# Patient Record
Sex: FEMALE | Race: ASIAN | NOT HISPANIC OR LATINO | Employment: FULL TIME | URBAN - METROPOLITAN AREA
[De-identification: names, ages, dates, MRNs, and addresses within clinical notes are randomized per-mention and may not be internally consistent; named-entity substitution may affect disease eponyms.]

---

## 2017-02-11 ENCOUNTER — CHARTING TRANS (OUTPATIENT)
Dept: URGENT CARE | Age: 37
End: 2017-02-11

## 2017-02-11 ASSESSMENT — PAIN SCALES - GENERAL: PAINLEVEL_OUTOF10: 5

## 2017-04-04 ENCOUNTER — LAB ENCOUNTER (OUTPATIENT)
Dept: LAB | Age: 37
End: 2017-04-04
Attending: FAMILY MEDICINE
Payer: COMMERCIAL

## 2017-04-04 ENCOUNTER — OFFICE VISIT (OUTPATIENT)
Dept: FAMILY MEDICINE CLINIC | Facility: CLINIC | Age: 37
End: 2017-04-04

## 2017-04-04 VITALS
SYSTOLIC BLOOD PRESSURE: 112 MMHG | OXYGEN SATURATION: 97 % | HEART RATE: 76 BPM | HEIGHT: 62 IN | RESPIRATION RATE: 18 BRPM | DIASTOLIC BLOOD PRESSURE: 74 MMHG | TEMPERATURE: 97 F | WEIGHT: 166.38 LBS | BODY MASS INDEX: 30.62 KG/M2

## 2017-04-04 DIAGNOSIS — E55.9 VITAMIN D DEFICIENCY: ICD-10-CM

## 2017-04-04 DIAGNOSIS — L21.9 SEBORRHEIC DERMATITIS OF SCALP: ICD-10-CM

## 2017-04-04 DIAGNOSIS — Z00.00 ROUTINE GENERAL MEDICAL EXAMINATION AT A HEALTH CARE FACILITY: ICD-10-CM

## 2017-04-04 DIAGNOSIS — Z00.00 ROUTINE GENERAL MEDICAL EXAMINATION AT A HEALTH CARE FACILITY: Primary | ICD-10-CM

## 2017-04-04 DIAGNOSIS — E66.9 OBESITY (BMI 30.0-34.9): ICD-10-CM

## 2017-04-04 DIAGNOSIS — Z01.419 ENCOUNTER FOR ROUTINE GYNECOLOGICAL EXAMINATION WITH PAPANICOLAOU SMEAR OF CERVIX: ICD-10-CM

## 2017-04-04 PROCEDURE — 82607 VITAMIN B-12: CPT | Performed by: FAMILY MEDICINE

## 2017-04-04 PROCEDURE — 80061 LIPID PANEL: CPT | Performed by: FAMILY MEDICINE

## 2017-04-04 PROCEDURE — 36415 COLL VENOUS BLD VENIPUNCTURE: CPT | Performed by: FAMILY MEDICINE

## 2017-04-04 PROCEDURE — 83550 IRON BINDING TEST: CPT | Performed by: FAMILY MEDICINE

## 2017-04-04 PROCEDURE — 82306 VITAMIN D 25 HYDROXY: CPT | Performed by: FAMILY MEDICINE

## 2017-04-04 PROCEDURE — 88175 CYTOPATH C/V AUTO FLUID REDO: CPT | Performed by: FAMILY MEDICINE

## 2017-04-04 PROCEDURE — 99395 PREV VISIT EST AGE 18-39: CPT | Performed by: FAMILY MEDICINE

## 2017-04-04 PROCEDURE — 82728 ASSAY OF FERRITIN: CPT | Performed by: FAMILY MEDICINE

## 2017-04-04 PROCEDURE — 83540 ASSAY OF IRON: CPT | Performed by: FAMILY MEDICINE

## 2017-04-04 PROCEDURE — 80050 GENERAL HEALTH PANEL: CPT | Performed by: FAMILY MEDICINE

## 2017-04-04 NOTE — PROGRESS NOTES
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 HPI:   Patient is seen for her annual physical and pap  Last pap in 2014 was normal    PAST MEDICAL HISTORY:   History reviewed.  No pertinent past m heartbeat, faintness or syncope, no chest pressure or chest pain on exertion. No edema or varicose veins. GI: No change in appetite, diarrhea, constipation, or blood in stool. No hemorrhoids.  Has occasional heart burn  : No pain, frequency, urgency or i anteverted. Adnexae nontender, no masses. MUSCULOSKELETAL: Back and extremities within normal limits  EXTREMITIES: no cyanosis, clubbing or edema.  Peripheral pulses normal.  NEURO: Nonfocal exam. Oriented times three,cranial nerves are intact,motor and se

## 2017-04-04 NOTE — PATIENT INSTRUCTIONS
Prevention Guidelines, Women Ages 25 to 44  Screening tests and vaccines are an important part of managing your health. Health counseling is essential, too. Below are guidelines for these, for women ages 25 to 44.  Talk with your healthcare provider to ma Chickenpox (varicella) All women in this age group who have no record of this infection or vaccine 2 doses; the second dose should be given 4 to 8 weeks after the first dose   Hepatitis A Women at increased risk for infection – talk with your healthcare pr Breast cancer and chemoprevention Women at high risk for breast cancer When your risk is known   Diet and exercise Women who are overweight or obese When diagnosed, and then at routine exams   Domestic violence Women at the age in which they are able to ha Your goal doesn't even have to be a specific weight. You may decide on a fitness goal (such as being able to walk 10 miles a week), or a health goal (such as lowering your blood pressure).  Choose a goal that is measurable and reasonable, so you know when y

## 2017-04-10 NOTE — PROGRESS NOTES
Quick Note:    Spoke to patient informed of needing test results appointment.    4/11/2017 10:00 AM Vikki mAin MD EMG 21 EMG [address]    ______

## 2017-04-11 ENCOUNTER — OFFICE VISIT (OUTPATIENT)
Dept: FAMILY MEDICINE CLINIC | Facility: CLINIC | Age: 37
End: 2017-04-11

## 2017-04-11 VITALS
HEART RATE: 81 BPM | WEIGHT: 165.13 LBS | TEMPERATURE: 98 F | BODY MASS INDEX: 30 KG/M2 | RESPIRATION RATE: 20 BRPM | SYSTOLIC BLOOD PRESSURE: 112 MMHG | OXYGEN SATURATION: 99 % | DIASTOLIC BLOOD PRESSURE: 72 MMHG

## 2017-04-11 DIAGNOSIS — E55.9 VITAMIN D DEFICIENCY: ICD-10-CM

## 2017-04-11 DIAGNOSIS — D50.0 IRON DEFICIENCY ANEMIA DUE TO CHRONIC BLOOD LOSS: Primary | ICD-10-CM

## 2017-04-11 DIAGNOSIS — L40.9 PSORIASIS OF SCALP: ICD-10-CM

## 2017-04-11 DIAGNOSIS — E53.8 VITAMIN B12 DEFICIENCY: ICD-10-CM

## 2017-04-11 PROCEDURE — 99213 OFFICE O/P EST LOW 20 MIN: CPT | Performed by: FAMILY MEDICINE

## 2017-04-11 RX ORDER — ERGOCALCIFEROL 1.25 MG/1
50000 CAPSULE ORAL WEEKLY
Qty: 12 CAPSULE | Refills: 0 | Status: SHIPPED | OUTPATIENT
Start: 2017-04-11 | End: 2017-11-22

## 2017-04-11 NOTE — PATIENT INSTRUCTIONS
Ferrous sulfate 325 mg 2 times daily for 3 months and then once daily    Vitamin B12 overt the counter 1000 mg daily    Shampoo once daily for 1 week, 2 times a week for 3 weeks and stop.

## 2017-04-11 NOTE — PROGRESS NOTES
Keegan Sears is a 29year old female. Patient presents with:  Test Results: Discuss lab results. HPI:   Patient is seen for follow up and to discuss her labs.     Complaining of dry flaky scalp, patient states has been using over the counter joseline Lymphocytes %          Monocytes %          Eosinophils %          Basophils %          Immature Granulocyte %          Glucose      70-99 mg/dL    BUN      8-20 mg/dL    CREATININE      0.55-1.02 mg/dL    GFR      >=60    CALCIUM      8.3-10.3 mg/dL (3) uL 5.20   Neutrophils Absolute      1.30-6.70 x10(3) uL 5.20   Lymphocytes Absolute      0.90-4.00 x10(3) uL 2.16   Monocytes Absolute      0.10-0.60 x10(3) uL 0.62 (H)   Eosinophils Absolute      0.00-0.30 x10(3) uL 0.04   Basophils Absolute      0.00 <200 mg/dL 183   Triglycerides      <150 mg/dL 128   HDL Cholesterol      >45 mg/dL 45 (L)   LDL Cholesterol Calc      <130 mg/dL 112   VLDL      5-40 mg/dL 26   T.  CHOL/HDL RATIO      <4.44 4.07   NON HDL CHOL      <130 mg/dL 138 (H)   Iron, Serum      28

## 2017-06-05 ENCOUNTER — LAB SERVICES (OUTPATIENT)
Dept: OTHER | Age: 37
End: 2017-06-05

## 2017-06-05 ENCOUNTER — CHARTING TRANS (OUTPATIENT)
Dept: URGENT CARE | Age: 37
End: 2017-06-05

## 2017-06-05 LAB — DEPRECATED S PYO AG THROAT QL EIA: NEGATIVE

## 2017-06-05 ASSESSMENT — PAIN SCALES - GENERAL: PAINLEVEL_OUTOF10: 8

## 2017-07-01 ENCOUNTER — CHARTING TRANS (OUTPATIENT)
Dept: URGENT CARE | Age: 37
End: 2017-07-01

## 2017-07-01 ASSESSMENT — PAIN SCALES - GENERAL: PAINLEVEL_OUTOF10: 7

## 2017-10-14 ENCOUNTER — OFFICE VISIT (OUTPATIENT)
Dept: FAMILY MEDICINE CLINIC | Facility: CLINIC | Age: 37
End: 2017-10-14

## 2017-10-14 VITALS
DIASTOLIC BLOOD PRESSURE: 68 MMHG | WEIGHT: 170.38 LBS | TEMPERATURE: 99 F | RESPIRATION RATE: 16 BRPM | BODY MASS INDEX: 31 KG/M2 | SYSTOLIC BLOOD PRESSURE: 112 MMHG | HEART RATE: 76 BPM | OXYGEN SATURATION: 98 %

## 2017-10-14 DIAGNOSIS — E66.9 OBESITY (BMI 30.0-34.9): ICD-10-CM

## 2017-10-14 DIAGNOSIS — G47.9 SLEEP DISORDER: Primary | ICD-10-CM

## 2017-10-14 PROCEDURE — 99213 OFFICE O/P EST LOW 20 MIN: CPT | Performed by: FAMILY MEDICINE

## 2017-10-14 NOTE — PROGRESS NOTES
Darcy Guzman is a 40year old female. Patient presents with: Follow - Up: Pt here to discuss weight and progress of vitamins  Sleep Problem: Pt having hard time getting sleep.   Imm/Inj: Refused flu vaccine    HPI:   Patient is seen for follow-up today for follow - up, sleep problem and imm/inj. Diagnoses and all orders for this visit:    Sleep disorder  Patient referred to center for sleep medicine    Obesity (BMI 30.0-34. 9)  Encouraged healthy, portion controlled diet and exercise for 30-45 mi

## 2017-10-14 NOTE — PATIENT INSTRUCTIONS
Please make an appointment with the sleep specialist.    Please finish vitamin D and recheck your labs. Weight Management: Exercise and Activity  Studies show that people who exercise are the most likely to lose weight and keep it off.  Exercise bu © 5345-5168 The [address] [place] [address], [address] [place]. All rights reserved. This information is not intended as a substitute for professional medical care. Always follow your healthcare professional's instructions.         Weight · Eat slower, shooting for 20 to 30 minutes for each meal. It takes 20 minutes for your stomach to tell your brain that it’s full.  Slow eaters tend to eat less and are still satisfied, while fast eaters may tend to be overeaters.   · Pay attention to what

## 2017-11-21 ENCOUNTER — APPOINTMENT (OUTPATIENT)
Dept: LAB | Age: 37
End: 2017-11-21
Attending: FAMILY MEDICINE
Payer: COMMERCIAL

## 2017-11-21 DIAGNOSIS — E55.9 VITAMIN D DEFICIENCY: ICD-10-CM

## 2017-11-21 DIAGNOSIS — E53.8 VITAMIN B12 DEFICIENCY: ICD-10-CM

## 2017-11-21 DIAGNOSIS — D50.0 IRON DEFICIENCY ANEMIA DUE TO CHRONIC BLOOD LOSS: ICD-10-CM

## 2017-11-21 PROCEDURE — 82306 VITAMIN D 25 HYDROXY: CPT | Performed by: FAMILY MEDICINE

## 2017-11-21 PROCEDURE — 36415 COLL VENOUS BLD VENIPUNCTURE: CPT | Performed by: FAMILY MEDICINE

## 2017-11-21 PROCEDURE — 82607 VITAMIN B-12: CPT | Performed by: FAMILY MEDICINE

## 2017-11-21 PROCEDURE — 83540 ASSAY OF IRON: CPT | Performed by: FAMILY MEDICINE

## 2017-11-21 PROCEDURE — 83550 IRON BINDING TEST: CPT | Performed by: FAMILY MEDICINE

## 2017-11-21 PROCEDURE — 82728 ASSAY OF FERRITIN: CPT | Performed by: FAMILY MEDICINE

## 2018-04-13 ENCOUNTER — OFFICE VISIT (OUTPATIENT)
Dept: FAMILY MEDICINE CLINIC | Facility: CLINIC | Age: 38
End: 2018-04-13

## 2018-04-13 VITALS
SYSTOLIC BLOOD PRESSURE: 100 MMHG | RESPIRATION RATE: 16 BRPM | OXYGEN SATURATION: 99 % | BODY MASS INDEX: 30.2 KG/M2 | DIASTOLIC BLOOD PRESSURE: 70 MMHG | HEART RATE: 80 BPM | HEIGHT: 62 IN | WEIGHT: 164.13 LBS | TEMPERATURE: 98 F

## 2018-04-13 DIAGNOSIS — E61.1 IRON DEFICIENCY: ICD-10-CM

## 2018-04-13 DIAGNOSIS — E53.8 VITAMIN B12 DEFICIENCY: ICD-10-CM

## 2018-04-13 DIAGNOSIS — E55.9 VITAMIN D DEFICIENCY: ICD-10-CM

## 2018-04-13 DIAGNOSIS — Z00.00 ROUTINE GENERAL MEDICAL EXAMINATION AT A HEALTH CARE FACILITY: Primary | ICD-10-CM

## 2018-04-13 PROCEDURE — 99395 PREV VISIT EST AGE 18-39: CPT | Performed by: FAMILY MEDICINE

## 2018-04-13 NOTE — PROGRESS NOTES
Governor Debbie is a 40year old female here for   Well Adult: Pt here for annual physical and pap.     HPI:   Patient is seen for her annual physical   Pap done last years was normal.    Diet and exercise: Patient states has been exercising more regul dyspnea on exertion, wheezing, hemoptysis. Cardiovascular: No heart palpitations, irregular heartbeat, faintness or syncope, no chest pressure or chest pain on exertion. No edema or varicose veins.   GI: No change in appetite, diarrhea, constipation, or bl and sensory are grossly intact, speech normal, DTR's equal bilaterally    ASSESSMENT AND PLAN:   Ro was seen today for well adult.     Diagnoses and all orders for this visit:    Routine general medical examination at a health care facility  -     CBC

## 2018-04-14 ENCOUNTER — LABORATORY ENCOUNTER (OUTPATIENT)
Dept: LAB | Age: 38
End: 2018-04-14
Attending: FAMILY MEDICINE
Payer: COMMERCIAL

## 2018-04-14 DIAGNOSIS — E55.9 VITAMIN D DEFICIENCY: ICD-10-CM

## 2018-04-14 DIAGNOSIS — Z00.00 ROUTINE GENERAL MEDICAL EXAMINATION AT A HEALTH CARE FACILITY: ICD-10-CM

## 2018-04-14 DIAGNOSIS — E61.1 IRON DEFICIENCY: ICD-10-CM

## 2018-04-14 DIAGNOSIS — E53.8 VITAMIN B12 DEFICIENCY: ICD-10-CM

## 2018-04-14 PROCEDURE — 80061 LIPID PANEL: CPT | Performed by: FAMILY MEDICINE

## 2018-04-14 PROCEDURE — 80050 GENERAL HEALTH PANEL: CPT | Performed by: FAMILY MEDICINE

## 2018-04-14 PROCEDURE — 82607 VITAMIN B-12: CPT | Performed by: FAMILY MEDICINE

## 2018-04-14 PROCEDURE — 36415 COLL VENOUS BLD VENIPUNCTURE: CPT | Performed by: FAMILY MEDICINE

## 2018-04-14 PROCEDURE — 83550 IRON BINDING TEST: CPT | Performed by: FAMILY MEDICINE

## 2018-04-14 PROCEDURE — 82728 ASSAY OF FERRITIN: CPT | Performed by: FAMILY MEDICINE

## 2018-04-14 PROCEDURE — 83540 ASSAY OF IRON: CPT | Performed by: FAMILY MEDICINE

## 2018-04-14 PROCEDURE — 82306 VITAMIN D 25 HYDROXY: CPT | Performed by: FAMILY MEDICINE

## 2018-04-16 DIAGNOSIS — E53.8 VITAMIN B12 DEFICIENCY: ICD-10-CM

## 2018-04-16 DIAGNOSIS — E55.9 VITAMIN D DEFICIENCY: Primary | ICD-10-CM

## 2018-04-16 DIAGNOSIS — D50.0 IRON DEFICIENCY ANEMIA DUE TO CHRONIC BLOOD LOSS: ICD-10-CM

## 2018-04-16 RX ORDER — ERGOCALCIFEROL 1.25 MG/1
50000 CAPSULE ORAL WEEKLY
Qty: 4 CAPSULE | Refills: 0 | Status: SHIPPED | OUTPATIENT
Start: 2018-04-16 | End: 2018-05-29

## 2018-05-29 DIAGNOSIS — E55.9 VITAMIN D DEFICIENCY: ICD-10-CM

## 2018-05-31 RX ORDER — ERGOCALCIFEROL 1.25 MG/1
50000 CAPSULE ORAL WEEKLY
Qty: 4 CAPSULE | Refills: 1 | Status: SHIPPED | OUTPATIENT
Start: 2018-05-31 | End: 2018-06-30

## 2018-05-31 NOTE — TELEPHONE ENCOUNTER
LOV    LAST LAB  Prescription for vitamin D sent to pharmacy please take once a week for 3 months and then continue over-the-counter vitamin D 2000 IUs daily.  Recheck labs in 3 months.        LAST RX   ergocalciferol 99794 units Oral Cap 4 capsule 0 4/16/2

## 2018-11-28 VITALS
DIASTOLIC BLOOD PRESSURE: 82 MMHG | HEART RATE: 85 BPM | RESPIRATION RATE: 18 BRPM | TEMPERATURE: 98.4 F | SYSTOLIC BLOOD PRESSURE: 120 MMHG | OXYGEN SATURATION: 99 %

## 2018-11-28 VITALS
OXYGEN SATURATION: 100 % | SYSTOLIC BLOOD PRESSURE: 122 MMHG | DIASTOLIC BLOOD PRESSURE: 72 MMHG | TEMPERATURE: 97.2 F | RESPIRATION RATE: 16 BRPM | HEART RATE: 74 BPM

## 2018-11-29 VITALS
SYSTOLIC BLOOD PRESSURE: 122 MMHG | OXYGEN SATURATION: 98 % | TEMPERATURE: 98.1 F | HEART RATE: 78 BPM | RESPIRATION RATE: 16 BRPM | DIASTOLIC BLOOD PRESSURE: 69 MMHG

## 2019-04-15 ENCOUNTER — OFFICE VISIT (OUTPATIENT)
Dept: FAMILY MEDICINE CLINIC | Facility: CLINIC | Age: 39
End: 2019-04-15
Payer: COMMERCIAL

## 2019-04-15 VITALS
RESPIRATION RATE: 16 BRPM | DIASTOLIC BLOOD PRESSURE: 68 MMHG | SYSTOLIC BLOOD PRESSURE: 102 MMHG | TEMPERATURE: 97 F | OXYGEN SATURATION: 98 % | WEIGHT: 170 LBS | BODY MASS INDEX: 31.28 KG/M2 | HEART RATE: 68 BPM | HEIGHT: 62 IN

## 2019-04-15 DIAGNOSIS — E55.9 VITAMIN D DEFICIENCY: ICD-10-CM

## 2019-04-15 DIAGNOSIS — Z00.00 ROUTINE GENERAL MEDICAL EXAMINATION AT A HEALTH CARE FACILITY: Primary | ICD-10-CM

## 2019-04-15 DIAGNOSIS — E66.9 OBESITY (BMI 30.0-34.9): ICD-10-CM

## 2019-04-15 DIAGNOSIS — N76.2 ACUTE VULVITIS: ICD-10-CM

## 2019-04-15 DIAGNOSIS — E61.1 IRON DEFICIENCY: ICD-10-CM

## 2019-04-15 PROCEDURE — 99395 PREV VISIT EST AGE 18-39: CPT | Performed by: FAMILY MEDICINE

## 2019-04-15 NOTE — PATIENT INSTRUCTIONS
stop the use of any products that may be causing the irritation and to wear loose-fitting, breathable white cotton undergarments, apply over the counter hydrocortisone 1% twice a day for 3-5 days. If symptoms are not better please follow up.       Naz Escalante have 1 or more other risk factors for diabetes At least every 3 years.  Also, testing for diabetes during pregnancy after the 24th week.    Type 2 diabetes, prediabetes All women diagnosed with gestational diabetes Lifelong testing every 3 years   Type 2 di first dose   Influenza (flu) All women in this age group Once a year   Measles, mumps, rubella (MMR) All women in this age group who have no record of these infections or vaccines 1 or 2 doses   Meningococcal Women at increased risk for infection should ta catch-up vaccines recommended by the CDC. Date Last Reviewed: 10/1/2017  © 4490-5829 The [employer] [address]. [address] [facility] [place], [address] [place] [address]. All rights reserved. This information is not intended as a substitute for professional medical care. dance class  · Walk the dog  · Ride a bike  If you have health problems, be sure to ask your healthcare provider before you start an exercise program. Have a  help you develop a plan that’s safe for you.    Date Last Reviewed: 4/1/2018

## 2019-04-19 ENCOUNTER — LAB ENCOUNTER (OUTPATIENT)
Dept: LAB | Age: 39
End: 2019-04-19
Attending: FAMILY MEDICINE
Payer: COMMERCIAL

## 2019-04-19 DIAGNOSIS — R73.01 IMPAIRED FASTING BLOOD SUGAR: ICD-10-CM

## 2019-04-19 DIAGNOSIS — Z00.00 ROUTINE GENERAL MEDICAL EXAMINATION AT A HEALTH CARE FACILITY: ICD-10-CM

## 2019-04-19 DIAGNOSIS — E55.9 VITAMIN D DEFICIENCY: ICD-10-CM

## 2019-04-19 DIAGNOSIS — E61.1 IRON DEFICIENCY: ICD-10-CM

## 2019-04-19 PROCEDURE — 80061 LIPID PANEL: CPT | Performed by: FAMILY MEDICINE

## 2019-04-19 PROCEDURE — 82306 VITAMIN D 25 HYDROXY: CPT | Performed by: FAMILY MEDICINE

## 2019-04-19 PROCEDURE — 82728 ASSAY OF FERRITIN: CPT | Performed by: FAMILY MEDICINE

## 2019-04-19 PROCEDURE — 83550 IRON BINDING TEST: CPT | Performed by: FAMILY MEDICINE

## 2019-04-19 PROCEDURE — 80050 GENERAL HEALTH PANEL: CPT | Performed by: FAMILY MEDICINE

## 2019-04-19 PROCEDURE — 36415 COLL VENOUS BLD VENIPUNCTURE: CPT | Performed by: FAMILY MEDICINE

## 2019-04-19 PROCEDURE — 83036 HEMOGLOBIN GLYCOSYLATED A1C: CPT | Performed by: FAMILY MEDICINE

## 2019-04-19 PROCEDURE — 83540 ASSAY OF IRON: CPT | Performed by: FAMILY MEDICINE

## 2019-04-22 NOTE — PROGRESS NOTES
Darcy Guzman is a 41year old female here for   Patient presents with: Well Adult: Physical only.     HPI:   Patient is seen for her annual physical   Pap done 2017 was normal    Diet and exercise: Patient states has been exercising more regularly wheezing, hemoptysis. Cardiovascular: No heart palpitations, irregular heartbeat, faintness or syncope, no chest pressure or chest pain on exertion. No edema or varicose veins. GI: No change in appetite, diarrhea, constipation, or blood in stool.  No hemo exam. Oriented times three,cranial nerves are intact,motor and sensory are grossly intact, speech normal, DTR's equal bilaterally    ASSESSMENT AND PLAN:   Ro was seen today for well adult.     Diagnoses and all orders for this visit:    Routine gener

## 2019-04-24 NOTE — PROGRESS NOTES
Pt notified of results and information given. Left message on phone needs to be seen and sent to [facility].

## 2019-07-29 ENCOUNTER — APPOINTMENT (OUTPATIENT)
Dept: URGENT CARE | Age: 39
End: 2019-07-29

## 2019-07-29 ENCOUNTER — TELEPHONE (OUTPATIENT)
Dept: FAMILY MEDICINE CLINIC | Facility: CLINIC | Age: 39
End: 2019-07-29

## 2019-07-29 NOTE — TELEPHONE ENCOUNTER
Patient called in stating she thinks she may have a UTI. Advised Dr. Rivera Ours is gone for the day and she may want to go to [facility] to initiate testing and treatment. States it is not acute, has had symptoms over a month.   C/O \"eczema\" and itching in vagin

## 2019-07-30 ENCOUNTER — OFFICE VISIT (OUTPATIENT)
Dept: FAMILY MEDICINE CLINIC | Facility: CLINIC | Age: 39
End: 2019-07-30
Payer: COMMERCIAL

## 2019-07-30 VITALS
TEMPERATURE: 98 F | BODY MASS INDEX: 31.74 KG/M2 | DIASTOLIC BLOOD PRESSURE: 62 MMHG | OXYGEN SATURATION: 97 % | RESPIRATION RATE: 16 BRPM | SYSTOLIC BLOOD PRESSURE: 100 MMHG | HEIGHT: 62 IN | WEIGHT: 172.5 LBS | HEART RATE: 62 BPM

## 2019-07-30 DIAGNOSIS — D50.8 OTHER IRON DEFICIENCY ANEMIA: ICD-10-CM

## 2019-07-30 DIAGNOSIS — R73.03 PREDIABETES: ICD-10-CM

## 2019-07-30 DIAGNOSIS — N89.8 VAGINAL LEUKORRHEA: ICD-10-CM

## 2019-07-30 DIAGNOSIS — L29.2 VULVAR ITCHING: Primary | ICD-10-CM

## 2019-07-30 DIAGNOSIS — E55.9 VITAMIN D DEFICIENCY: ICD-10-CM

## 2019-07-30 PROCEDURE — 87480 CANDIDA DNA DIR PROBE: CPT | Performed by: FAMILY MEDICINE

## 2019-07-30 PROCEDURE — 99213 OFFICE O/P EST LOW 20 MIN: CPT | Performed by: FAMILY MEDICINE

## 2019-07-30 PROCEDURE — 87660 TRICHOMONAS VAGIN DIR PROBE: CPT | Performed by: FAMILY MEDICINE

## 2019-07-30 PROCEDURE — 87510 GARDNER VAG DNA DIR PROBE: CPT | Performed by: FAMILY MEDICINE

## 2019-07-30 RX ORDER — PNV NO.95/FERROUS FUM/FOLIC AC 28MG-0.8MG
TABLET ORAL
COMMUNITY
End: 2020-07-17 | Stop reason: ALTCHOICE

## 2019-07-30 RX ORDER — ERGOCALCIFEROL 1.25 MG/1
50000 CAPSULE ORAL WEEKLY
Qty: 4 CAPSULE | Refills: 2 | Status: SHIPPED | OUTPATIENT
Start: 2019-07-30 | End: 2020-07-17 | Stop reason: ALTCHOICE

## 2019-07-30 NOTE — PROGRESS NOTES
Tenzin Flowers is a 41year old female. Patient presents with:  Test Results: Discuss test results.   Vaginal Problem: Itchying external and irritation for 3-4 weeks    HPI:   Patient complaining of itching in the external genital area for the past 3 Absolute      0.00 - 0.20 x10(3) uL 0.03   Immature Granulocyte Absolute      0.00 - 1.00 x10(3) uL 0.04   Neutrophils %      % 58.7   Lymphocytes %      % 32.3   Monocytes %      % 7.1   Eosinophils %      % 0.9   Basophils %      % 0.4   Immature Granulo PROBE    Vaginal leukorrhea  -     Cancel: VAGINITIS/VAGINOSIS, DNA PROBE  -     VAGINITIS/VAGINOSIS, DNA PROBE    Vitamin D deficiency  -     ergocalciferol 81199 units Oral Cap; Take 1 capsule (50,000 Units total) by mouth once a week.   -     VITAMIN D,

## 2019-07-30 NOTE — PATIENT INSTRUCTIONS
Will call with results, if positive will treat. Continue over the counter ferrous sulfate 325 mg daily. Prediabetes  You have been diagnosed with prediabetes. This means that the level of sugar (glucose) in your blood is too high.  If you have prediab (not eaten) for at least 8 hours. A normal test result is 99 milligrams per deciliter (mg/dL) or lower. Prediabetes is 100 mg/dL to 125 mg/dL. Diabetes is 126 mg/dL or higher.   · Glucose tolerance test. Your blood sugar is measured before and after you dri [facility] [place], [address]. All rights reserved. This information is not intended as a substitute for professional medical care. Always follow your healthcare professional's instructions.

## 2019-12-01 ENCOUNTER — WALK IN (OUTPATIENT)
Dept: URGENT CARE | Age: 39
End: 2019-12-01

## 2019-12-01 DIAGNOSIS — J06.9 ACUTE URI: Primary | ICD-10-CM

## 2019-12-01 PROCEDURE — 99213 OFFICE O/P EST LOW 20 MIN: CPT | Performed by: FAMILY MEDICINE

## 2019-12-01 RX ORDER — PREDNISONE 20 MG/1
40 TABLET ORAL DAILY
Qty: 10 TABLET | Refills: 0 | Status: SHIPPED | OUTPATIENT
Start: 2019-12-01 | End: 2019-12-06

## 2019-12-01 SDOH — HEALTH STABILITY: MENTAL HEALTH: HOW OFTEN DO YOU HAVE A DRINK CONTAINING ALCOHOL?: NEVER

## 2019-12-01 ASSESSMENT — PAIN SCALES - GENERAL: PAINLEVEL: 5-6

## 2020-04-16 ENCOUNTER — TELEPHONE (OUTPATIENT)
Dept: FAMILY MEDICINE CLINIC | Facility: CLINIC | Age: 40
End: 2020-04-16

## 2020-07-17 ENCOUNTER — OFFICE VISIT (OUTPATIENT)
Dept: FAMILY MEDICINE CLINIC | Facility: CLINIC | Age: 40
End: 2020-07-17
Payer: COMMERCIAL

## 2020-07-17 VITALS
OXYGEN SATURATION: 99 % | HEIGHT: 62 IN | TEMPERATURE: 98 F | BODY MASS INDEX: 30 KG/M2 | WEIGHT: 163 LBS | SYSTOLIC BLOOD PRESSURE: 110 MMHG | DIASTOLIC BLOOD PRESSURE: 72 MMHG | RESPIRATION RATE: 18 BRPM | HEART RATE: 70 BPM

## 2020-07-17 DIAGNOSIS — Z00.00 ROUTINE GENERAL MEDICAL EXAMINATION AT A HEALTH CARE FACILITY: Primary | ICD-10-CM

## 2020-07-17 DIAGNOSIS — Z01.419 ENCOUNTER FOR ROUTINE GYNECOLOGICAL EXAMINATION WITH PAPANICOLAOU SMEAR OF CERVIX: ICD-10-CM

## 2020-07-17 DIAGNOSIS — R73.03 PREDIABETES: ICD-10-CM

## 2020-07-17 PROBLEM — N89.8 VAGINAL LEUKORRHEA: Status: RESOLVED | Noted: 2019-07-30 | Resolved: 2020-07-17

## 2020-07-17 PROBLEM — L29.2 VULVAR ITCHING: Status: RESOLVED | Noted: 2019-07-30 | Resolved: 2020-07-17

## 2020-07-17 PROCEDURE — 87624 HPV HI-RISK TYP POOLED RSLT: CPT | Performed by: FAMILY MEDICINE

## 2020-07-17 PROCEDURE — 3074F SYST BP LT 130 MM HG: CPT | Performed by: FAMILY MEDICINE

## 2020-07-17 PROCEDURE — 3078F DIAST BP <80 MM HG: CPT | Performed by: FAMILY MEDICINE

## 2020-07-17 PROCEDURE — 99395 PREV VISIT EST AGE 18-39: CPT | Performed by: FAMILY MEDICINE

## 2020-07-17 PROCEDURE — 88175 CYTOPATH C/V AUTO FLUID REDO: CPT | Performed by: FAMILY MEDICINE

## 2020-07-17 PROCEDURE — 3008F BODY MASS INDEX DOCD: CPT | Performed by: FAMILY MEDICINE

## 2020-07-17 NOTE — PROGRESS NOTES
Shu Renteria is a 46year old female. Patient presents with:  Physical    HPI:   Shu Renteria is a 46year old female seen for annual physical and Pap.   All previous Paps have been normal.    Diet and exercise: Patient states has been [place] shortness of breath. Cardiovascular: Negative for chest pain and palpitations. Gastrointestinal: Negative for nausea, abdominal pain, constipation and blood in stool. Endocrine: Negative for cold intolerance, heat intolerance and polyuria.    Genitou axillary lymphadenopathy. Abdominal: Soft. Bowel sounds are normal. She exhibits no distension and no mass. There is no tenderness.    Genitourinary:    Genitourinary Comments: Normal external genitalia, normal pink vaginal mucosa, no discharge, cervix ap

## 2020-07-17 NOTE — PATIENT INSTRUCTIONS
Prevention Guidelines, Women Ages 25 to 44  Screening tests and vaccines are an important part of managing your health. A screening test is done to find possible disorders or diseases in people who don't have any symptoms.  The goal is to find a disease e diabetes Lifelong testing every 3 years   Type 2 diabetes All women with prediabetes Every year   Gonorrhea Sexually active women at increased risk for infection At routine exams   Hepatitis C Anyone at increased risk At routine exams   HIV All women cherise Meningococcal Women at increased risk for infection should talk with their healthcare provider 1 or more doses   Pneumococcal conjugate vaccine (PCV13) and pneumococcal polysaccharide vaccine (PPSV23) Women at increased risk for infection should talk wit instructions.

## 2020-07-20 LAB — HPV I/H RISK 1 DNA SPEC QL NAA+PROBE: NEGATIVE

## 2020-07-22 LAB
LAST PAP RESULT: NORMAL
PAP HISTORY (OTHER THAN LAST PAP): NORMAL

## 2020-07-26 LAB
ABSOLUTE BASOPHILS: 17 CELLS/UL (ref 0–200)
ABSOLUTE EOSINOPHILS: 57 CELLS/UL (ref 15–500)
ABSOLUTE LYMPHOCYTES: 1921 CELLS/UL (ref 850–3900)
ABSOLUTE MONOCYTES: 428 CELLS/UL (ref 200–950)
ABSOLUTE NEUTROPHILS: 3278 CELLS/UL (ref 1500–7800)
ALBUMIN/GLOBULIN RATIO: 1.5 (CALC) (ref 1–2.5)
ALBUMIN: 4.1 G/DL (ref 3.6–5.1)
ALKALINE PHOSPHATASE: 86 U/L (ref 31–125)
ALT: 12 U/L (ref 6–29)
AST: 15 U/L (ref 10–30)
BASOPHILS: 0.3 %
BILIRUBIN, TOTAL: 0.4 MG/DL (ref 0.2–1.2)
BUN: 11 MG/DL (ref 7–25)
CALCIUM: 9.2 MG/DL (ref 8.6–10.2)
CARBON DIOXIDE: 25 MMOL/L (ref 20–32)
CHLORIDE: 106 MMOL/L (ref 98–110)
CHOL/HDLC RATIO: 4.2 (CALC)
CHOLESTEROL, TOTAL: 189 MG/DL
CREATININE: 0.8 MG/DL (ref 0.5–1.1)
EGFR IF AFRICN AM: 108 ML/MIN/1.73M2
EGFR IF NONAFRICN AM: 94 ML/MIN/1.73M2
EOSINOPHILS: 1 %
GLOBULIN: 2.7 G/DL (CALC) (ref 1.9–3.7)
GLUCOSE: 100 MG/DL (ref 65–99)
HDL CHOLESTEROL: 45 MG/DL
HEMATOCRIT: 30.5 % (ref 35–45)
HEMOGLOBIN A1C: 5.4 % OF TOTAL HGB
HEMOGLOBIN: 9 G/DL (ref 11.7–15.5)
LDL-CHOLESTEROL: 122 MG/DL (CALC)
LYMPHOCYTES: 33.7 %
MCH: 21.6 PG (ref 27–33)
MCHC: 29.5 G/DL (ref 32–36)
MCV: 73.3 FL (ref 80–100)
MONOCYTES: 7.5 %
MPV: 9.9 FL (ref 7.5–12.5)
NEUTROPHILS: 57.5 %
NON-HDL CHOLESTEROL: 144 MG/DL (CALC)
PLATELET COUNT: 318 THOUSAND/UL (ref 140–400)
POTASSIUM: 4.4 MMOL/L (ref 3.5–5.3)
PROTEIN, TOTAL: 6.8 G/DL (ref 6.1–8.1)
RDW: 15.7 % (ref 11–15)
RED BLOOD CELL COUNT: 4.16 MILLION/UL (ref 3.8–5.1)
SODIUM: 138 MMOL/L (ref 135–146)
TRIGLYCERIDES: 112 MG/DL
TSH: 1 MIU/L
WHITE BLOOD CELL COUNT: 5.7 THOUSAND/UL (ref 3.8–10.8)

## 2020-07-30 ENCOUNTER — TELEPHONE (OUTPATIENT)
Dept: FAMILY MEDICINE CLINIC | Facility: CLINIC | Age: 40
End: 2020-07-30

## 2020-07-30 DIAGNOSIS — E55.9 VITAMIN D DEFICIENCY: Primary | ICD-10-CM

## 2020-07-30 DIAGNOSIS — D50.8 OTHER IRON DEFICIENCY ANEMIA: ICD-10-CM

## 2020-07-30 NOTE — TELEPHONE ENCOUNTER
Patient went last week foe lab work , but patient wants iron and vit D done. She wants to know if they can use the blood from last week or if she has to go back again ? ?

## 2020-07-30 NOTE — TELEPHONE ENCOUNTER
Cannot add to the old blood draw, if she wants these tested can place order for iron, TIBC, ferritin and vitamin D.

## 2020-07-30 NOTE — TELEPHONE ENCOUNTER
Dr. Anegl David,  Please advise if these are tests that you want ordered. It's been 5 days since blood draw, not sure if they can use sample at lab.

## 2020-07-31 NOTE — TELEPHONE ENCOUNTER
Lm for pt (Lottie Elizondo per HIPAA) to inform we have confirmed with lab cannot add labs to previous specimen. We ordered iron, TIBC, ferritin and vitamin D to [facility] as requested- to complete at earliest convenience.  To call back at the office if any further question

## 2020-09-05 LAB
% SATURATION: 5 % (CALC) (ref 16–45)
FERRITIN: 3 NG/ML (ref 16–154)
IRON BINDING CAPACITY: 444 MCG/DL (CALC) (ref 250–450)
IRON, TOTAL: 20 MCG/DL (ref 40–190)
VITAMIN D, 25-OH, TOTAL: 18 NG/ML (ref 30–100)

## 2020-12-04 DIAGNOSIS — E55.9 VITAMIN D DEFICIENCY: ICD-10-CM

## 2020-12-04 RX ORDER — ERGOCALCIFEROL 1.25 MG/1
CAPSULE ORAL
Qty: 12 CAPSULE | Refills: 0 | OUTPATIENT
Start: 2020-12-04

## 2021-02-23 ENCOUNTER — OFFICE VISIT (OUTPATIENT)
Dept: FAMILY MEDICINE CLINIC | Facility: CLINIC | Age: 41
End: 2021-02-23
Payer: COMMERCIAL

## 2021-02-23 VITALS
RESPIRATION RATE: 18 BRPM | SYSTOLIC BLOOD PRESSURE: 120 MMHG | WEIGHT: 163 LBS | HEART RATE: 64 BPM | DIASTOLIC BLOOD PRESSURE: 80 MMHG | OXYGEN SATURATION: 98 % | BODY MASS INDEX: 30 KG/M2 | HEIGHT: 62 IN | TEMPERATURE: 98 F

## 2021-02-23 DIAGNOSIS — L65.9 HAIR LOSS: ICD-10-CM

## 2021-02-23 DIAGNOSIS — F51.5 NIGHTMARES: ICD-10-CM

## 2021-02-23 DIAGNOSIS — N91.0 DELAYED PERIOD: Primary | ICD-10-CM

## 2021-02-23 DIAGNOSIS — G47.9 SLEEP DISORDER: ICD-10-CM

## 2021-02-23 LAB — CONTROL LINE PRESENT WITH A CLEAR BACKGROUND (YES/NO): YES YES/NO

## 2021-02-23 PROCEDURE — 99213 OFFICE O/P EST LOW 20 MIN: CPT | Performed by: FAMILY MEDICINE

## 2021-02-23 PROCEDURE — 3079F DIAST BP 80-89 MM HG: CPT | Performed by: FAMILY MEDICINE

## 2021-02-23 PROCEDURE — 81025 URINE PREGNANCY TEST: CPT | Performed by: FAMILY MEDICINE

## 2021-02-23 PROCEDURE — 3008F BODY MASS INDEX DOCD: CPT | Performed by: FAMILY MEDICINE

## 2021-02-23 PROCEDURE — 3074F SYST BP LT 130 MM HG: CPT | Performed by: FAMILY MEDICINE

## 2021-02-23 NOTE — PATIENT INSTRUCTIONS
Please get your labs done and follow up with the sleep specialist.    If you do not get your period for another 2 months please follow up.

## 2021-02-24 ENCOUNTER — LAB ENCOUNTER (OUTPATIENT)
Dept: LAB | Age: 41
End: 2021-02-24
Attending: FAMILY MEDICINE
Payer: COMMERCIAL

## 2021-02-24 LAB — TSI SER-ACNC: 1.15 MIU/ML (ref 0.36–3.74)

## 2021-02-24 PROCEDURE — 36415 COLL VENOUS BLD VENIPUNCTURE: CPT | Performed by: FAMILY MEDICINE

## 2021-02-24 PROCEDURE — 84443 ASSAY THYROID STIM HORMONE: CPT | Performed by: FAMILY MEDICINE

## 2021-02-24 NOTE — PROGRESS NOTES
Blanche Herrera is a 45year old female. Patient presents with:  No Period/no Cycle    HPI:   Blanche Herrera is a 45year old female complaining that her period is delayed by 2 weeks, states took a pregnancy test at home and it was negative.  St

## 2021-03-23 ENCOUNTER — MED REC SCAN ONLY (OUTPATIENT)
Dept: FAMILY MEDICINE CLINIC | Facility: CLINIC | Age: 41
End: 2021-03-23

## 2021-05-25 VITALS
HEART RATE: 78 BPM | SYSTOLIC BLOOD PRESSURE: 113 MMHG | TEMPERATURE: 98.3 F | RESPIRATION RATE: 16 BRPM | OXYGEN SATURATION: 99 % | DIASTOLIC BLOOD PRESSURE: 68 MMHG

## 2021-08-24 ENCOUNTER — OFFICE VISIT (OUTPATIENT)
Dept: FAMILY MEDICINE CLINIC | Facility: CLINIC | Age: 41
End: 2021-08-24
Payer: COMMERCIAL

## 2021-08-24 VITALS
RESPIRATION RATE: 18 BRPM | OXYGEN SATURATION: 98 % | HEART RATE: 72 BPM | BODY MASS INDEX: 30.55 KG/M2 | HEIGHT: 62 IN | TEMPERATURE: 97 F | SYSTOLIC BLOOD PRESSURE: 102 MMHG | DIASTOLIC BLOOD PRESSURE: 72 MMHG | WEIGHT: 166 LBS

## 2021-08-24 DIAGNOSIS — Z00.00 ROUTINE GENERAL MEDICAL EXAMINATION AT A HEALTH CARE FACILITY: Primary | ICD-10-CM

## 2021-08-24 DIAGNOSIS — R73.03 PREDIABETES: ICD-10-CM

## 2021-08-24 DIAGNOSIS — E55.9 VITAMIN D DEFICIENCY: ICD-10-CM

## 2021-08-24 DIAGNOSIS — D50.8 OTHER IRON DEFICIENCY ANEMIA: ICD-10-CM

## 2021-08-24 DIAGNOSIS — Z12.31 ENCOUNTER FOR SCREENING MAMMOGRAM FOR MALIGNANT NEOPLASM OF BREAST: ICD-10-CM

## 2021-08-24 DIAGNOSIS — R53.83 FATIGUE, UNSPECIFIED TYPE: ICD-10-CM

## 2021-08-24 DIAGNOSIS — R10.2 PELVIC PAIN: ICD-10-CM

## 2021-08-24 PROCEDURE — 3008F BODY MASS INDEX DOCD: CPT | Performed by: FAMILY MEDICINE

## 2021-08-24 PROCEDURE — 99396 PREV VISIT EST AGE 40-64: CPT | Performed by: FAMILY MEDICINE

## 2021-08-24 PROCEDURE — 3074F SYST BP LT 130 MM HG: CPT | Performed by: FAMILY MEDICINE

## 2021-08-24 PROCEDURE — 3078F DIAST BP <80 MM HG: CPT | Performed by: FAMILY MEDICINE

## 2021-08-24 NOTE — PROGRESS NOTES
Tenzin Flowers is a 37year old female.   Patient presents with:  Physical    HPI:   Tenzin Flowers is a 37year old female with no significant past medical history seen for her annual physical.  Pap done in 2020 was normal  Does do breast self- appetite change, fever and unexpected weight change. HENT: Negative for congestion, ear pain, hearing loss and sore throat. Eyes: Negative for discharge, redness and visual disturbance.    Respiratory: Negative for cough, chest tightness and shortness Effort: Pulmonary effort is normal. No respiratory distress. Breath sounds: Normal breath sounds. Comments: Breast: Symmetrical bilateral, no skin changes, no nipple retraction or discharge, no palpable lump or mass, no axillary lymphadenopathy.

## 2021-08-24 NOTE — PATIENT INSTRUCTIONS
Prevention Guidelines, Women Ages 36 to 52  Screening tests and vaccines are an important part of managing your health. A screening test is done to find diseases in people who don't have any symptoms.  The goal is to find a disease early so lifestyle armendariz sigmoidoscopy every 5 years, or  · Colonoscopy every 10 years, or  · CT colonography (virtual colonoscopy) every 5 years, or  · Yearly fecal occult blood test, or  · Yearly fecal immunochemical test every year, or  · Stool DNA test, every 3 years  If you c least 4 weeks after the first dose   Hepatitis A Women at increased risk for infection–talk with your healthcare provider 2 doses given 6 months apart   Hepatitis B Women at increased risk for infection–talk with your healthcare provider 3 doses over 6 mon American Academy of Ophthalmology  [employer] last reviewed this educational content on 11/1/2017  © 8164-6845 The [employer]. All rights reserved. This information is not intended as a substitute for professional medical care.  Always follow your

## 2021-08-27 LAB
% SATURATION: 6 % (CALC) (ref 16–45)
ABSOLUTE BASOPHILS: 22 CELLS/UL (ref 0–200)
ABSOLUTE EOSINOPHILS: 59 CELLS/UL (ref 15–500)
ABSOLUTE LYMPHOCYTES: 1510 CELLS/UL (ref 850–3900)
ABSOLUTE MONOCYTES: 511 CELLS/UL (ref 200–950)
ABSOLUTE NEUTROPHILS: 5298 CELLS/UL (ref 1500–7800)
ALBUMIN/GLOBULIN RATIO: 1.5 (CALC) (ref 1–2.5)
ALBUMIN: 4.2 G/DL (ref 3.6–5.1)
ALKALINE PHOSPHATASE: 117 U/L (ref 31–125)
ALT: 29 U/L (ref 6–29)
AST: 25 U/L (ref 10–30)
BASOPHILS: 0.3 %
BILIRUBIN, TOTAL: 0.4 MG/DL (ref 0.2–1.2)
BUN: 9 MG/DL (ref 7–25)
CALCIUM: 8.7 MG/DL (ref 8.6–10.2)
CARBON DIOXIDE: 26 MMOL/L (ref 20–32)
CHLORIDE: 106 MMOL/L (ref 98–110)
CHOL/HDLC RATIO: 4.1 (CALC)
CHOLESTEROL, TOTAL: 196 MG/DL
CREATININE: 0.78 MG/DL (ref 0.5–1.1)
EGFR IF AFRICN AM: 110 ML/MIN/1.73M2
EGFR IF NONAFRICN AM: 95 ML/MIN/1.73M2
EOSINOPHILS: 0.8 %
FERRITIN: 3 NG/ML (ref 16–154)
GLOBULIN: 2.8 G/DL (CALC) (ref 1.9–3.7)
GLUCOSE: 113 MG/DL (ref 65–99)
HDL CHOLESTEROL: 48 MG/DL
HEMATOCRIT: 30.4 % (ref 35–45)
HEMOGLOBIN A1C: 5.7 % OF TOTAL HGB
HEMOGLOBIN: 8.6 G/DL (ref 11.7–15.5)
IRON BINDING CAPACITY: 478 MCG/DL (CALC) (ref 250–450)
IRON, TOTAL: 30 MCG/DL (ref 40–190)
LDL-CHOLESTEROL: 124 MG/DL (CALC)
LYMPHOCYTES: 20.4 %
MCH: 19.6 PG (ref 27–33)
MCHC: 28.3 G/DL (ref 32–36)
MCV: 69.4 FL (ref 80–100)
MONOCYTES: 6.9 %
MPV: 9.5 FL (ref 7.5–12.5)
NEUTROPHILS: 71.6 %
NON-HDL CHOLESTEROL: 148 MG/DL (CALC)
PLATELET COUNT: 237 THOUSAND/UL (ref 140–400)
POTASSIUM: 4.6 MMOL/L (ref 3.5–5.3)
PROTEIN, TOTAL: 7 G/DL (ref 6.1–8.1)
RDW: 16.5 % (ref 11–15)
RED BLOOD CELL COUNT: 4.38 MILLION/UL (ref 3.8–5.1)
SODIUM: 138 MMOL/L (ref 135–146)
TRIGLYCERIDES: 128 MG/DL
TSH: 1.03 MIU/L
VITAMIN B12: 307 PG/ML (ref 200–1100)
VITAMIN D, 25-OH, TOTAL: 14 NG/ML (ref 30–100)
WHITE BLOOD CELL COUNT: 7.4 THOUSAND/UL (ref 3.8–10.8)

## 2021-09-01 ENCOUNTER — OFFICE VISIT (OUTPATIENT)
Dept: FAMILY MEDICINE CLINIC | Facility: CLINIC | Age: 41
End: 2021-09-01
Payer: COMMERCIAL

## 2021-09-01 VITALS
WEIGHT: 164 LBS | RESPIRATION RATE: 16 BRPM | BODY MASS INDEX: 30.18 KG/M2 | OXYGEN SATURATION: 99 % | TEMPERATURE: 98 F | DIASTOLIC BLOOD PRESSURE: 72 MMHG | SYSTOLIC BLOOD PRESSURE: 110 MMHG | HEIGHT: 62 IN | HEART RATE: 70 BPM

## 2021-09-01 DIAGNOSIS — D50.0 IRON DEFICIENCY ANEMIA DUE TO CHRONIC BLOOD LOSS: ICD-10-CM

## 2021-09-01 DIAGNOSIS — R73.03 PREDIABETES: ICD-10-CM

## 2021-09-01 DIAGNOSIS — E53.8 VITAMIN B12 DEFICIENCY: ICD-10-CM

## 2021-09-01 DIAGNOSIS — E55.9 VITAMIN D DEFICIENCY: Primary | ICD-10-CM

## 2021-09-01 DIAGNOSIS — R53.83 FATIGUE, UNSPECIFIED TYPE: ICD-10-CM

## 2021-09-01 PROCEDURE — 3008F BODY MASS INDEX DOCD: CPT | Performed by: FAMILY MEDICINE

## 2021-09-01 PROCEDURE — 99214 OFFICE O/P EST MOD 30 MIN: CPT | Performed by: FAMILY MEDICINE

## 2021-09-01 PROCEDURE — 3078F DIAST BP <80 MM HG: CPT | Performed by: FAMILY MEDICINE

## 2021-09-01 PROCEDURE — 3074F SYST BP LT 130 MM HG: CPT | Performed by: FAMILY MEDICINE

## 2021-09-01 RX ORDER — ERGOCALCIFEROL 1.25 MG/1
50000 CAPSULE ORAL WEEKLY
Qty: 12 CAPSULE | Refills: 0 | Status: SHIPPED | OUTPATIENT
Start: 2021-09-01 | End: 2021-11-30

## 2021-09-01 NOTE — PROGRESS NOTES
Barbara Acevedo is a 37year old female. Patient presents with:  Lab Results: review    HPI:   Barbara Acevedo is a 37year old female with history of iron and vitamin D deficiency seen for follow up to discuss her labs.     ALLERGY:   No Known A 6. 9   EOSINOPHILS      % 0.8   BASOPHILS      % 0.3   COMMENT(S)          Glucose      65 - 99 mg/dL 113 (H)   BUN      7 - 25 mg/dL 9   CREATININE      0.50 - 1.10 mg/dL 0.78   eGFR NON-AFR.  AMERICAN      > OR = 60 mL/min/1.73m2 95   eGFR AFRICAN AMERICAN Future  -     FERRITIN; Future  -     CBC WITH DIFFERENTIAL WITH PLATELET;  Future  -     IRON AND TIBC  -     FERRITIN  -     CBC WITH DIFFERENTIAL WITH PLATELET    Fatigue, unspecified type    Vitamin B12 deficiency

## 2021-10-19 ENCOUNTER — HOSPITAL ENCOUNTER (OUTPATIENT)
Dept: MAMMOGRAPHY | Age: 41
Discharge: HOME | End: 2021-10-19
Attending: FAMILY MEDICINE
Payer: COMMERCIAL

## 2021-10-19 DIAGNOSIS — Z12.31 ENCOUNTER FOR SCREENING MAMMOGRAM FOR MALIGNANT NEOPLASM OF BREAST: ICD-10-CM

## 2021-10-19 PROCEDURE — 77067 SCR MAMMO BI INCL CAD: CPT | Performed by: FAMILY MEDICINE

## 2021-10-19 PROCEDURE — 77063 BREAST TOMOSYNTHESIS BI: CPT | Performed by: FAMILY MEDICINE

## 2021-11-19 DIAGNOSIS — E55.9 VITAMIN D DEFICIENCY: ICD-10-CM

## 2021-11-19 RX ORDER — ERGOCALCIFEROL 1.25 MG/1
CAPSULE ORAL
Qty: 12 CAPSULE | Refills: 0 | OUTPATIENT
Start: 2021-11-19

## 2021-12-03 ENCOUNTER — MED REC SCAN ONLY (OUTPATIENT)
Dept: FAMILY MEDICINE CLINIC | Facility: CLINIC | Age: 41
End: 2021-12-03

## 2022-04-06 LAB
% SATURATION: 16 % (CALC) (ref 16–45)
ABSOLUTE BASOPHILS: 22 CELLS/UL (ref 0–200)
ABSOLUTE EOSINOPHILS: 67 CELLS/UL (ref 15–500)
ABSOLUTE LYMPHOCYTES: 1887 CELLS/UL (ref 850–3900)
ABSOLUTE MONOCYTES: 599 CELLS/UL (ref 200–950)
ABSOLUTE NEUTROPHILS: 4825 CELLS/UL (ref 1500–7800)
BASOPHILS: 0.3 %
EOSINOPHILS: 0.9 %
FERRITIN: 5 NG/ML (ref 16–154)
HEMATOCRIT: 35.5 % (ref 35–45)
HEMOGLOBIN: 11.5 G/DL (ref 11.7–15.5)
IRON BINDING CAPACITY: 424 MCG/DL (CALC) (ref 250–450)
IRON, TOTAL: 68 MCG/DL (ref 40–190)
LYMPHOCYTES: 25.5 %
MCH: 26.8 PG (ref 27–33)
MCHC: 32.4 G/DL (ref 32–36)
MCV: 82.8 FL (ref 80–100)
MONOCYTES: 8.1 %
MPV: 10.5 FL (ref 7.5–12.5)
NEUTROPHILS: 65.2 %
PLATELET COUNT: 259 THOUSAND/UL (ref 140–400)
RDW: 13.2 % (ref 11–15)
RED BLOOD CELL COUNT: 4.29 MILLION/UL (ref 3.8–5.1)
VITAMIN D, 25-OH, TOTAL: 29 NG/ML (ref 30–100)
WHITE BLOOD CELL COUNT: 7.4 THOUSAND/UL (ref 3.8–10.8)

## 2022-05-16 ENCOUNTER — TELEMEDICINE (OUTPATIENT)
Dept: TELEHEALTH | Age: 42
End: 2022-05-16

## 2022-05-16 ENCOUNTER — HOSPITAL ENCOUNTER (OUTPATIENT)
Age: 42
Discharge: HOME | End: 2022-05-16
Payer: COMMERCIAL

## 2022-05-16 ENCOUNTER — APPOINTMENT (OUTPATIENT)
Dept: GENERAL RADIOLOGY | Age: 42
End: 2022-05-16
Attending: NURSE PRACTITIONER
Payer: COMMERCIAL

## 2022-05-16 VITALS
DIASTOLIC BLOOD PRESSURE: 65 MMHG | HEIGHT: 61 IN | HEART RATE: 72 BPM | SYSTOLIC BLOOD PRESSURE: 119 MMHG | BODY MASS INDEX: 31.15 KG/M2 | RESPIRATION RATE: 18 BRPM | OXYGEN SATURATION: 100 % | TEMPERATURE: 98 F | WEIGHT: 165 LBS

## 2022-05-16 DIAGNOSIS — R07.89 CHEST TIGHTNESS: Primary | ICD-10-CM

## 2022-05-16 DIAGNOSIS — Z02.9 ADMINISTRATIVE ENCOUNTER: Primary | ICD-10-CM

## 2022-05-16 DIAGNOSIS — R05.9 COUGH: ICD-10-CM

## 2022-05-16 LAB
#MXD IC: 0.7 X10ˆ3/UL (ref 0.1–1)
BUN BLD-MCNC: 10 MG/DL (ref 7–18)
CHLORIDE BLD-SCNC: 104 MMOL/L (ref 98–112)
CO2 BLD-SCNC: 27 MMOL/L (ref 21–32)
CREAT BLD-MCNC: 0.7 MG/DL
DDIMER WHOLE BLOOD: <200 NG/ML DDU (ref ?–400)
GLUCOSE BLD-MCNC: 91 MG/DL (ref 70–99)
HCT VFR BLD AUTO: 36.1 %
HCT VFR BLD CALC: 35 %
HGB BLD-MCNC: 11.3 G/DL
ISTAT IONIZED CALCIUM FOR CHEM 8: 1.24 MMOL/L (ref 1.12–1.32)
LYMPHOCYTES # BLD AUTO: 2 X10ˆ3/UL (ref 1–4)
LYMPHOCYTES NFR BLD AUTO: 27.2 %
MCH RBC QN AUTO: 26 PG (ref 26–34)
MCHC RBC AUTO-ENTMCNC: 31.3 G/DL (ref 31–37)
MCV RBC AUTO: 83.2 FL (ref 80–100)
MIXED CELL %: 9.1 %
NEUTROPHILS # BLD AUTO: 4.6 X10ˆ3/UL (ref 1.5–7.7)
NEUTROPHILS NFR BLD AUTO: 63.7 %
PLATELET # BLD AUTO: 323 X10ˆ3/UL (ref 150–450)
POTASSIUM BLD-SCNC: 3.9 MMOL/L (ref 3.6–5.1)
RBC # BLD AUTO: 4.34 X10ˆ6/UL
SODIUM BLD-SCNC: 141 MMOL/L (ref 136–145)
TROPONIN I BLD-MCNC: <0.02 NG/ML
WBC # BLD AUTO: 7.3 X10ˆ3/UL (ref 4–11)

## 2022-05-16 PROCEDURE — 80047 BASIC METABLC PNL IONIZED CA: CPT

## 2022-05-16 PROCEDURE — 85378 FIBRIN DEGRADE SEMIQUANT: CPT | Performed by: NURSE PRACTITIONER

## 2022-05-16 PROCEDURE — 71046 X-RAY EXAM CHEST 2 VIEWS: CPT | Performed by: NURSE PRACTITIONER

## 2022-05-16 PROCEDURE — 84484 ASSAY OF TROPONIN QUANT: CPT

## 2022-05-16 PROCEDURE — 93010 ELECTROCARDIOGRAM REPORT: CPT

## 2022-05-16 PROCEDURE — 36415 COLL VENOUS BLD VENIPUNCTURE: CPT

## 2022-05-16 PROCEDURE — 99215 OFFICE O/P EST HI 40 MIN: CPT

## 2022-05-16 PROCEDURE — 85025 COMPLETE CBC W/AUTO DIFF WBC: CPT | Performed by: NURSE PRACTITIONER

## 2022-05-16 PROCEDURE — 99204 OFFICE O/P NEW MOD 45 MIN: CPT

## 2022-05-16 PROCEDURE — 93005 ELECTROCARDIOGRAM TRACING: CPT

## 2022-05-16 NOTE — ED INITIAL ASSESSMENT (HPI)
C/o sore throat for few days, Friday night started with cough and chest feels tight and a little short of breath.  Covid Positive 5/6/22

## 2022-05-18 LAB
ATRIAL RATE: 69 BPM
P AXIS: 53 DEGREES
P-R INTERVAL: 146 MS
Q-T INTERVAL: 416 MS
QRS DURATION: 84 MS
QTC CALCULATION (BEZET): 445 MS
R AXIS: 57 DEGREES
T AXIS: 48 DEGREES
VENTRICULAR RATE: 69 BPM

## 2022-06-02 ENCOUNTER — MED REC SCAN ONLY (OUTPATIENT)
Dept: FAMILY MEDICINE CLINIC | Facility: CLINIC | Age: 42
End: 2022-06-02

## 2022-11-28 ENCOUNTER — WALK IN (OUTPATIENT)
Dept: URGENT CARE | Age: 42
End: 2022-11-28

## 2022-11-28 VITALS
OXYGEN SATURATION: 100 % | HEART RATE: 70 BPM | TEMPERATURE: 97.1 F | DIASTOLIC BLOOD PRESSURE: 64 MMHG | SYSTOLIC BLOOD PRESSURE: 110 MMHG | RESPIRATION RATE: 16 BRPM

## 2022-11-28 DIAGNOSIS — J06.9 ACUTE UPPER RESPIRATORY INFECTION, UNSPECIFIED: ICD-10-CM

## 2022-11-28 DIAGNOSIS — R68.89 FLU-LIKE SYMPTOMS: Primary | ICD-10-CM

## 2022-11-28 DIAGNOSIS — J01.90 ACUTE SINUSITIS, RECURRENCE NOT SPECIFIED, UNSPECIFIED LOCATION: ICD-10-CM

## 2022-11-28 LAB
FLUAV AG UPPER RESP QL IA.RAPID: NEGATIVE
FLUBV AG UPPER RESP QL IA.RAPID: NEGATIVE
SARS-COV+SARS-COV-2 AG RESP QL IA.RAPID: NOT DETECTED
TEST LOT EXPIRATION DATE: NORMAL
TEST LOT NUMBER: NORMAL

## 2022-11-28 PROCEDURE — 87428 SARSCOV & INF VIR A&B AG IA: CPT | Performed by: INTERNAL MEDICINE

## 2022-11-28 PROCEDURE — 99214 OFFICE O/P EST MOD 30 MIN: CPT | Performed by: INTERNAL MEDICINE

## 2022-11-28 RX ORDER — CEFDINIR 300 MG/1
300 CAPSULE ORAL 2 TIMES DAILY
Qty: 20 CAPSULE | Refills: 0 | Status: SHIPPED | OUTPATIENT
Start: 2022-11-28 | End: 2022-12-08

## 2022-11-28 RX ORDER — DEXTROMETHORPHAN HYDROBROMIDE AND PROMETHAZINE HYDROCHLORIDE 15; 6.25 MG/5ML; MG/5ML
5 SYRUP ORAL 4 TIMES DAILY PRN
Qty: 120 ML | Refills: 0 | Status: SHIPPED | OUTPATIENT
Start: 2022-11-28 | End: 2022-12-08

## 2023-01-24 ENCOUNTER — OFFICE VISIT (OUTPATIENT)
Dept: FAMILY MEDICINE CLINIC | Facility: CLINIC | Age: 43
End: 2023-01-24
Payer: COMMERCIAL

## 2023-01-24 VITALS
OXYGEN SATURATION: 98 % | BODY MASS INDEX: 32.28 KG/M2 | DIASTOLIC BLOOD PRESSURE: 80 MMHG | TEMPERATURE: 98 F | SYSTOLIC BLOOD PRESSURE: 120 MMHG | HEART RATE: 76 BPM | RESPIRATION RATE: 18 BRPM | HEIGHT: 61 IN | WEIGHT: 171 LBS

## 2023-01-24 DIAGNOSIS — D50.0 IRON DEFICIENCY ANEMIA DUE TO CHRONIC BLOOD LOSS: ICD-10-CM

## 2023-01-24 DIAGNOSIS — E66.9 OBESITY (BMI 30.0-34.9): ICD-10-CM

## 2023-01-24 DIAGNOSIS — E55.9 VITAMIN D DEFICIENCY: ICD-10-CM

## 2023-01-24 DIAGNOSIS — G47.9 SLEEP DISORDER: ICD-10-CM

## 2023-01-24 DIAGNOSIS — E53.8 VITAMIN B12 DEFICIENCY: ICD-10-CM

## 2023-01-24 DIAGNOSIS — R73.03 PREDIABETES: ICD-10-CM

## 2023-01-24 DIAGNOSIS — Z12.31 ENCOUNTER FOR SCREENING MAMMOGRAM FOR MALIGNANT NEOPLASM OF BREAST: ICD-10-CM

## 2023-01-24 DIAGNOSIS — Z00.00 ROUTINE GENERAL MEDICAL EXAMINATION AT A HEALTH CARE FACILITY: Primary | ICD-10-CM

## 2023-01-24 DIAGNOSIS — D21.9 LEIOMYOMA: ICD-10-CM

## 2023-01-24 PROCEDURE — 3074F SYST BP LT 130 MM HG: CPT | Performed by: FAMILY MEDICINE

## 2023-01-24 PROCEDURE — 99396 PREV VISIT EST AGE 40-64: CPT | Performed by: FAMILY MEDICINE

## 2023-01-24 PROCEDURE — 3079F DIAST BP 80-89 MM HG: CPT | Performed by: FAMILY MEDICINE

## 2023-01-24 PROCEDURE — 3008F BODY MASS INDEX DOCD: CPT | Performed by: FAMILY MEDICINE

## 2023-02-03 LAB
% SATURATION: 5 % (CALC) (ref 16–45)
ABSOLUTE BASOPHILS: 19 CELLS/UL (ref 0–200)
ABSOLUTE EOSINOPHILS: 102 CELLS/UL (ref 15–500)
ABSOLUTE LYMPHOCYTES: 1760 CELLS/UL (ref 850–3900)
ABSOLUTE MONOCYTES: 525 CELLS/UL (ref 200–950)
ABSOLUTE NEUTROPHILS: 3994 CELLS/UL (ref 1500–7800)
ALBUMIN/GLOBULIN RATIO: 1.6 (CALC) (ref 1–2.5)
ALBUMIN: 4.2 G/DL (ref 3.6–5.1)
ALKALINE PHOSPHATASE: 90 U/L (ref 31–125)
ALT: 16 U/L (ref 6–29)
AST: 15 U/L (ref 10–30)
BASOPHILS: 0.3 %
BILIRUBIN, TOTAL: 0.4 MG/DL (ref 0.2–1.2)
BUN: 12 MG/DL (ref 7–25)
CALCIUM: 9.1 MG/DL (ref 8.6–10.2)
CARBON DIOXIDE: 27 MMOL/L (ref 20–32)
CHLORIDE: 103 MMOL/L (ref 98–110)
CHOL/HDLC RATIO: 4.2 (CALC)
CHOLESTEROL, TOTAL: 203 MG/DL
CREATININE: 0.72 MG/DL (ref 0.5–0.99)
EGFR: 108 ML/MIN/1.73M2
EOSINOPHILS: 1.6 %
FERRITIN: 4 NG/ML (ref 16–232)
GLOBULIN: 2.6 G/DL (CALC) (ref 1.9–3.7)
GLUCOSE: 117 MG/DL (ref 65–99)
HDL CHOLESTEROL: 48 MG/DL
HEMATOCRIT: 31.6 % (ref 35–45)
HEMOGLOBIN A1C: 6.1 % OF TOTAL HGB
HEMOGLOBIN: 9.5 G/DL (ref 11.7–15.5)
IRON BINDING CAPACITY: 448 MCG/DL (CALC) (ref 250–450)
IRON, TOTAL: 24 MCG/DL (ref 40–190)
LDL-CHOLESTEROL: 130 MG/DL (CALC)
LYMPHOCYTES: 27.5 %
MCH: 22.1 PG (ref 27–33)
MCHC: 30.1 G/DL (ref 32–36)
MCV: 73.7 FL (ref 80–100)
MONOCYTES: 8.2 %
MPV: 9.9 FL (ref 7.5–12.5)
NEUTROPHILS: 62.4 %
NON-HDL CHOLESTEROL: 155 MG/DL (CALC)
PLATELET COUNT: 349 THOUSAND/UL (ref 140–400)
POTASSIUM: 4.3 MMOL/L (ref 3.5–5.3)
PROTEIN, TOTAL: 6.8 G/DL (ref 6.1–8.1)
RDW: 16.3 % (ref 11–15)
RED BLOOD CELL COUNT: 4.29 MILLION/UL (ref 3.8–5.1)
SODIUM: 141 MMOL/L (ref 135–146)
TRIGLYCERIDES: 140 MG/DL
TSH: 0.85 MIU/L
VITAMIN B12: 270 PG/ML (ref 200–1100)
VITAMIN D, 25-OH, TOTAL: 18 NG/ML (ref 30–100)
WHITE BLOOD CELL COUNT: 6.4 THOUSAND/UL (ref 3.8–10.8)

## 2023-02-11 ENCOUNTER — HOSPITAL ENCOUNTER (OUTPATIENT)
Dept: MAMMOGRAPHY | Age: 43
Discharge: HOME | End: 2023-02-11
Attending: FAMILY MEDICINE
Payer: COMMERCIAL

## 2023-02-11 DIAGNOSIS — Z12.31 ENCOUNTER FOR SCREENING MAMMOGRAM FOR MALIGNANT NEOPLASM OF BREAST: ICD-10-CM

## 2023-02-11 PROCEDURE — 77063 BREAST TOMOSYNTHESIS BI: CPT | Performed by: FAMILY MEDICINE

## 2023-02-11 PROCEDURE — 77067 SCR MAMMO BI INCL CAD: CPT | Performed by: FAMILY MEDICINE

## 2023-02-24 ENCOUNTER — TELEPHONE (OUTPATIENT)
Dept: FAMILY MEDICINE CLINIC | Facility: CLINIC | Age: 43
End: 2023-02-24

## 2023-02-24 NOTE — TELEPHONE ENCOUNTER
Pt calling stating that she started taking an iron supplement at night time and it is making her nauseas. She would like to speak with nurse or doctor about alternative or what to do.  Please advise

## 2023-02-24 NOTE — TELEPHONE ENCOUNTER
Called and advised patient per Dr. Rashida Lowe note to try the Gentlease iron as it may be better tolerated with less side effects. Instructed to let us know if she doesn't tolerate it and we can try a prescription iron. Advised insurance may or may not cover rx. Better chance of coverage if she tries Gentlease first.  Advised of two different brands, Solgar or [employer]o. Verbalizes understanding.

## 2023-02-24 NOTE — TELEPHONE ENCOUNTER
Dr. Anneliese Mo,  Please advise    Do you have recommendations to reduce nausea or different formula like Iron Bisglycinate which supposedly has less GI side effects

## 2023-05-25 ENCOUNTER — WALK IN (OUTPATIENT)
Dept: URGENT CARE | Age: 43
End: 2023-05-25

## 2023-05-25 VITALS
TEMPERATURE: 96.6 F | SYSTOLIC BLOOD PRESSURE: 122 MMHG | RESPIRATION RATE: 16 BRPM | OXYGEN SATURATION: 100 % | HEART RATE: 67 BPM | DIASTOLIC BLOOD PRESSURE: 82 MMHG

## 2023-05-25 DIAGNOSIS — J02.9 PHARYNGITIS, UNSPECIFIED ETIOLOGY: Primary | ICD-10-CM

## 2023-05-25 LAB
INTERNAL PROCEDURAL CONTROLS ACCEPTABLE: YES
S PYO AG THROAT QL IA.RAPID: NEGATIVE
TEST LOT EXPIRATION DATE: NORMAL
TEST LOT NUMBER: NORMAL

## 2023-05-25 PROCEDURE — 87880 STREP A ASSAY W/OPTIC: CPT | Performed by: EMERGENCY MEDICINE

## 2023-05-25 PROCEDURE — 99214 OFFICE O/P EST MOD 30 MIN: CPT | Performed by: EMERGENCY MEDICINE

## 2023-05-25 RX ORDER — AZITHROMYCIN 500 MG/1
500 TABLET, FILM COATED ORAL DAILY
Qty: 5 TABLET | Refills: 0 | Status: SHIPPED | OUTPATIENT
Start: 2023-05-25 | End: 2023-09-29 | Stop reason: ALTCHOICE

## 2023-05-25 RX ORDER — PREDNISONE 20 MG/1
20 TABLET ORAL DAILY
Qty: 5 TABLET | Refills: 0 | Status: SHIPPED | OUTPATIENT
Start: 2023-05-25 | End: 2023-05-30

## 2023-07-14 ENCOUNTER — TELEPHONE (OUTPATIENT)
Dept: FAMILY MEDICINE CLINIC | Facility: CLINIC | Age: 43
End: 2023-07-14

## 2023-07-14 NOTE — TELEPHONE ENCOUNTER
Left message to schedule an appointment for the note she is requesting, either using [employer] or by calling us. Phone hours and number provided.

## 2023-07-14 NOTE — TELEPHONE ENCOUNTER
Dr. Javan Pineda,  please advise if note can be given or if OV is needed to discuss.     LOV 1/24/23   PE   +8    Ferritin has been low since at least 2014  Component  Ref Range & Units 12/20/14  9:28 AM   Ferritin  10.0 - 291.0 ng/mL 2.9 Low      Component  Ref Range & Units 12/20/14  9:28 AM   Iron  28 - 170 ug/dL 20 Low    Transferrin  200 - 360 mg/dL 308   Total Iron Binding Capacity  298 - 536 ug/dL 459   % Saturation  13 - 45 % 4 Low          Component  Ref Range & Units 2/2/23  8:20 AM   FERRITIN  16 - 232 ng/mL 4 Low         Component  Ref Range & Units 2/2/23  8:20 AM   IRON, TOTAL  40 - 190 mcg/dL 24 Low    IRON BINDING CAPACITY  250 - 450 mcg/dL (calc) 448   % SATURATION  16 - 45 % (calc) 5 Low      Diagnosed with Covid 5/5/22    Ref Range & Units 5/5/22 10:07 AM   [facility] SARS-COV-2 RNA, QL NAAT, RT PCR/TMA (COVID-19)  NOT DETECTED DETECTED Abnormal

## 2023-07-14 NOTE — TELEPHONE ENCOUNTER
Pt calling asking to get note from doctor regarding her low iron levels/dizzy issues since having covid. Her work is requesting note from PCP in order to consider working remotely. Pt says her drive is over an hour+.  Please advise

## 2023-08-01 ENCOUNTER — OFFICE VISIT (OUTPATIENT)
Dept: FAMILY MEDICINE CLINIC | Facility: CLINIC | Age: 43
End: 2023-08-01
Payer: COMMERCIAL

## 2023-08-01 VITALS
HEART RATE: 83 BPM | TEMPERATURE: 98 F | BODY MASS INDEX: 31.72 KG/M2 | SYSTOLIC BLOOD PRESSURE: 110 MMHG | OXYGEN SATURATION: 98 % | DIASTOLIC BLOOD PRESSURE: 80 MMHG | RESPIRATION RATE: 18 BRPM | HEIGHT: 61 IN | WEIGHT: 168 LBS

## 2023-08-01 DIAGNOSIS — D50.0 IRON DEFICIENCY ANEMIA DUE TO CHRONIC BLOOD LOSS: Primary | ICD-10-CM

## 2023-08-01 PROCEDURE — 99213 OFFICE O/P EST LOW 20 MIN: CPT | Performed by: FAMILY MEDICINE

## 2023-08-01 PROCEDURE — 3079F DIAST BP 80-89 MM HG: CPT | Performed by: FAMILY MEDICINE

## 2023-08-01 PROCEDURE — 3008F BODY MASS INDEX DOCD: CPT | Performed by: FAMILY MEDICINE

## 2023-08-01 PROCEDURE — 3074F SYST BP LT 130 MM HG: CPT | Performed by: FAMILY MEDICINE

## 2023-09-29 ENCOUNTER — WALK IN (OUTPATIENT)
Dept: URGENT CARE | Age: 43
End: 2023-09-29

## 2023-09-29 VITALS
TEMPERATURE: 97.2 F | SYSTOLIC BLOOD PRESSURE: 108 MMHG | DIASTOLIC BLOOD PRESSURE: 74 MMHG | OXYGEN SATURATION: 100 % | HEART RATE: 69 BPM | RESPIRATION RATE: 16 BRPM

## 2023-09-29 DIAGNOSIS — L84 SKIN CALLUS: Primary | ICD-10-CM

## 2023-09-29 PROCEDURE — 99213 OFFICE O/P EST LOW 20 MIN: CPT | Performed by: INTERNAL MEDICINE

## 2023-11-16 ENCOUNTER — IMAGING SERVICES (OUTPATIENT)
Dept: GENERAL RADIOLOGY | Age: 43
End: 2023-11-16
Attending: FAMILY MEDICINE

## 2023-11-16 ENCOUNTER — WALK IN (OUTPATIENT)
Dept: URGENT CARE | Age: 43
End: 2023-11-16

## 2023-11-16 VITALS
RESPIRATION RATE: 16 BRPM | DIASTOLIC BLOOD PRESSURE: 82 MMHG | SYSTOLIC BLOOD PRESSURE: 117 MMHG | HEART RATE: 70 BPM | OXYGEN SATURATION: 100 % | TEMPERATURE: 97.4 F

## 2023-11-16 DIAGNOSIS — S59.911A INJURY OF RIGHT FOREARM, INITIAL ENCOUNTER: ICD-10-CM

## 2023-11-16 DIAGNOSIS — S50.11XA CONTUSION OF RIGHT FOREARM, INITIAL ENCOUNTER: Primary | ICD-10-CM

## 2023-11-16 PROCEDURE — 99214 OFFICE O/P EST MOD 30 MIN: CPT | Performed by: FAMILY MEDICINE

## 2023-11-16 PROCEDURE — 73090 X-RAY EXAM OF FOREARM: CPT | Performed by: STUDENT IN AN ORGANIZED HEALTH CARE EDUCATION/TRAINING PROGRAM

## 2023-12-28 ENCOUNTER — WALK IN (OUTPATIENT)
Dept: URGENT CARE | Age: 43
End: 2023-12-28

## 2023-12-28 VITALS
DIASTOLIC BLOOD PRESSURE: 64 MMHG | SYSTOLIC BLOOD PRESSURE: 125 MMHG | HEART RATE: 74 BPM | RESPIRATION RATE: 16 BRPM | TEMPERATURE: 98.6 F | OXYGEN SATURATION: 100 %

## 2023-12-28 DIAGNOSIS — R50.9 FEVER, UNSPECIFIED FEVER CAUSE: Primary | ICD-10-CM

## 2023-12-28 LAB
FLUAV AG UPPER RESP QL IA.RAPID: POSITIVE
FLUBV AG UPPER RESP QL IA.RAPID: NEGATIVE
INTERNAL PROCEDURAL CONTROLS ACCEPTABLE: YES
S PYO AG THROAT QL IA.RAPID: NEGATIVE
SARS-COV+SARS-COV-2 AG RESP QL IA.RAPID: NOT DETECTED
TEST LOT EXPIRATION DATE: ABNORMAL
TEST LOT EXPIRATION DATE: NORMAL
TEST LOT NUMBER: ABNORMAL
TEST LOT NUMBER: NORMAL

## 2023-12-28 PROCEDURE — 87428 SARSCOV & INF VIR A&B AG IA: CPT | Performed by: INTERNAL MEDICINE

## 2023-12-28 PROCEDURE — 99214 OFFICE O/P EST MOD 30 MIN: CPT | Performed by: INTERNAL MEDICINE

## 2023-12-28 PROCEDURE — 87880 STREP A ASSAY W/OPTIC: CPT | Performed by: INTERNAL MEDICINE

## 2023-12-28 RX ORDER — OSELTAMIVIR PHOSPHATE 75 MG/1
75 CAPSULE ORAL 2 TIMES DAILY
Qty: 10 CAPSULE | Refills: 0 | Status: SHIPPED | OUTPATIENT
Start: 2023-12-28 | End: 2024-01-02

## 2024-02-19 ENCOUNTER — OFFICE VISIT (OUTPATIENT)
Dept: FAMILY MEDICINE CLINIC | Facility: CLINIC | Age: 44
End: 2024-02-19
Payer: COMMERCIAL

## 2024-02-19 VITALS
DIASTOLIC BLOOD PRESSURE: 80 MMHG | BODY MASS INDEX: 31.15 KG/M2 | OXYGEN SATURATION: 98 % | HEIGHT: 61 IN | SYSTOLIC BLOOD PRESSURE: 120 MMHG | WEIGHT: 165 LBS | TEMPERATURE: 98 F | RESPIRATION RATE: 18 BRPM | HEART RATE: 78 BPM

## 2024-02-19 DIAGNOSIS — E53.8 VITAMIN B12 DEFICIENCY: ICD-10-CM

## 2024-02-19 DIAGNOSIS — Z12.4 SCREENING FOR CERVICAL CANCER: ICD-10-CM

## 2024-02-19 DIAGNOSIS — R73.03 PREDIABETES: ICD-10-CM

## 2024-02-19 DIAGNOSIS — Z12.31 VISIT FOR SCREENING MAMMOGRAM: ICD-10-CM

## 2024-02-19 DIAGNOSIS — E55.9 VITAMIN D DEFICIENCY: ICD-10-CM

## 2024-02-19 DIAGNOSIS — Z00.00 ROUTINE GENERAL MEDICAL EXAMINATION AT A HEALTH CARE FACILITY: Primary | ICD-10-CM

## 2024-02-19 DIAGNOSIS — E61.1 IRON DEFICIENCY: ICD-10-CM

## 2024-02-19 PROCEDURE — 87624 HPV HI-RISK TYP POOLED RSLT: CPT | Performed by: FAMILY MEDICINE

## 2024-02-19 PROCEDURE — 99396 PREV VISIT EST AGE 40-64: CPT | Performed by: FAMILY MEDICINE

## 2024-02-19 PROCEDURE — 88175 CYTOPATH C/V AUTO FLUID REDO: CPT | Performed by: FAMILY MEDICINE

## 2024-02-19 NOTE — PROGRESS NOTES
Ro Chandler is a 43 year old female.  Chief Complaint   Patient presents with    Physical     HPI:   Ro Chandler is a 43 year old female with history of iron deficiency anemia seen for her annual physical and pap.  Menstrual cycle is regular and not heavy.  Does do breast self exam, no family history of breast cancer.    Has not been taking iron supplement, + hair loss.    Healthy diet and has been exercising regularly.    PAST MEDICAL HISTORY:     Past Medical History:   Diagnosis Date    Iron deficiency      PAST SURGICAL HISTORY:     Past Surgical History:   Procedure Laterality Date           ALLERGY:   No Known Allergies  MEDICATIONS:     No current outpatient medications on file.     FAMILY HISTORY:     Family History   Problem Relation Age of Onset    Diabetes Father     Diabetes Maternal Grandmother     Cancer Maternal Grandmother         throat cancer     SOCIAL HISTORY:     Social History     Socioeconomic History    Marital status:      Spouse name: maddie    Number of children: 2   Occupational History    Occupation: [employer] for [employer]   Tobacco Use    Smoking status: Never    Smokeless tobacco: Never   Substance and Sexual Activity    Alcohol use: No    Drug use: No    Sexual activity: Yes     Partners: Male   Other Topics Concern     Service No    Blood Transfusions No    Caffeine Concern No    Occupational Exposure No    Hobby Hazards No    Sleep Concern Yes     Comment: not sleeping well    Stress Concern No    Weight Concern No    Special Diet No    Back Care No    Exercise No    Bike Helmet No    Seat Belt Yes    Self-Exams Yes   Social History Narrative    Balanced diet, does not exercise     REVIEW OF SYSTEMS:   Review of Systems   Constitutional:  Negative for appetite change, fatigue, fever and unexpected weight change.   HENT:  Negative for congestion, ear pain, hearing loss and sore throat.    Eyes:  Negative for discharge, redness and visual  disturbance.   Respiratory:  Negative for cough, chest tightness and shortness of breath.    Cardiovascular:  Negative for chest pain and palpitations.   Gastrointestinal:  Negative for abdominal pain, blood in stool, constipation and nausea.   Endocrine: Negative for cold intolerance, heat intolerance and polyuria.   Genitourinary:  Negative for difficulty urinating, dysuria, frequency and urgency.   Musculoskeletal:  Negative for arthralgias, gait problem, joint swelling and myalgias.   Skin:  Negative for rash.   Allergic/Immunologic: Negative for food allergies.   Neurological:  Negative for dizziness, weakness, numbness and headaches.   Hematological:  Negative for adenopathy. Does not bruise/bleed easily.   Psychiatric/Behavioral:  Negative for dysphoric mood and sleep disturbance. The patient is not nervous/anxious.         PHYSICAL EXAM:   /80   Pulse 78   Temp 98 °F (36.7 °C) (Temporal)   Resp 18   Ht 5' 1\" (1.549 m)   Wt 165 lb (74.8 kg)   LMP 01/18/2024 (Approximate)   SpO2 98%   BMI 31.18 kg/m²     Physical Exam  Constitutional:       General: She is not in acute distress.     Appearance: Normal appearance. She is well-developed and normal weight.   HENT:      Head: Normocephalic and atraumatic.      Right Ear: Tympanic membrane, ear canal and external ear normal.      Left Ear: Tympanic membrane, ear canal and external ear normal.      Nose: Nose normal.      Mouth/Throat:      Mouth: Mucous membranes are moist.      Pharynx: Oropharynx is clear.   Eyes:      Extraocular Movements: Extraocular movements intact.      Conjunctiva/sclera: Conjunctivae normal.      Pupils: Pupils are equal, round, and reactive to light.   Neck:      Thyroid: No thyromegaly.   Cardiovascular:      Rate and Rhythm: Normal rate and regular rhythm.      Pulses: Normal pulses.      Heart sounds: Normal heart sounds. No murmur heard.  Pulmonary:      Effort: Pulmonary effort is normal. No respiratory distress.       Breath sounds: Normal breath sounds.   Chest:      Chest wall: No tenderness.   Breasts:     Right: No swelling, inverted nipple, mass, nipple discharge, skin change or tenderness.      Left: No swelling, inverted nipple, mass, nipple discharge, skin change or tenderness.   Abdominal:      General: Bowel sounds are normal. There is no distension.      Palpations: Abdomen is soft. There is no mass.      Tenderness: There is no abdominal tenderness.      Hernia: There is no hernia in the left inguinal area or right inguinal area.      Comments: No HSM   Genitourinary:     General: Normal vulva.      Exam position: Lithotomy position.      Labia:         Right: No rash or lesion.         Left: No rash or lesion.       Urethra: No urethral pain or urethral lesion.      Vagina: Normal. No vaginal discharge, erythema or lesions.      Cervix: Normal. No cervical motion tenderness, lesion or erythema.      Uterus: Normal. Not deviated, not enlarged, not fixed, not tender and no uterine prolapse.       Adnexa: Right adnexa normal and left adnexa normal.        Right: No mass, tenderness or fullness.          Left: No mass, tenderness or fullness.        Rectum: Normal.   Musculoskeletal:         General: Normal range of motion.      Cervical back: Normal range of motion and neck supple.   Lymphadenopathy:      Cervical: No cervical adenopathy.      Upper Body:      Right upper body: No supraclavicular, axillary or pectoral adenopathy.      Left upper body: No supraclavicular, axillary or pectoral adenopathy.      Lower Body: No right inguinal adenopathy. No left inguinal adenopathy.   Skin:     General: Skin is warm.      Findings: No rash.   Neurological:      General: No focal deficit present.      Mental Status: She is alert and oriented to person, place, and time.      Deep Tendon Reflexes: Reflexes are normal and symmetric.   Psychiatric:         Mood and Affect: Mood normal.         Behavior: Behavior normal.          Thought Content: Thought content normal.         Judgment: Judgment normal.        ASSESSMENT AND PLAN:   Ro was seen today for physical.    Diagnoses and all orders for this visit:    Routine general medical examination at a health care facility  -     Assay, Thyroid Stim Hormone  -     Lipid Panel  -     Comp Metabolic Panel (14)  -     CBC With Differential With Platelet    Visit for screening mammogram  -     3D Mammogram Digital Screen, Bilateral (CPT=77067/83442); Future    Screening for cervical cancer  -     ThinPrep PAP Smear; Future  -     Hpv Dna  High Risk , Thin Prep Collect; Future  -     ThinPrep PAP Smear  -     Hpv Dna  High Risk , Thin Prep Collect  -     Image-Guided Pap Smear ([facility])    Vitamin D deficiency  -     Vitamin D, 25-Hydroxy    Iron deficiency  -     IRON AND TIBC [7573] [Q]  -     FERRITIN [457] [Q]    Prediabetes  -     HGB A1C [496] [Q]    Vitamin B12 deficiency  -     VITAMIN B12 [927][Q]    Age appropriate anticipatory guidance discussed  Health Maintenance   Topic Date Due    COVID-19 Vaccine (4 - 2023-24 season) 09/01/2023    Annual Physical  Done today    Mammogram  02/11/2024    Influenza Vaccine (1) 06/30/2024 (Originally 10/1/2023)    DTaP,Tdap,and Td Vaccines (2 - Td or Tdap) 12/09/2024    Pap Smear  02/19/2027    Annual Depression Screening  Completed    Pneumococcal Vaccine: Birth to 64yrs  Aged Out        The 21st Century Cures Act makes medical notes like these available to patients in the interest of transparency. Please be advised this is a medical document. Medical documents are intended to carry relevant information, facts as evident, and the clinical opinion of the practitioner. The medical note is intended as peer to peer communication and may appear blunt or direct. It is written in medical language and may contain abbreviations or verbiage that are unfamiliar.

## 2024-02-19 NOTE — PATIENT INSTRUCTIONS
SLEEP HYGIENE:    Eliminate stimulants like ( Caffeine) after 1 PM, if still with sleeping difficulties, then keep moving back the last caffeine intake  Discontinue daytime naps  Regular exercise improves sleep but do not exercise within 3 hours of your bedtime.  Move dinner to at least 4 hrs before bedtime  No fluids 2 hrs before bedtime.  Keep your bedroom dark and cool  Avois all electronic before bedtime  Go to bed at the same time daily  White nose like a fan or relaxing music will help  Watch You tube Luis Mathew sleep hypnosis    With depression and insomnia Bring light therapy helps with seasonal depression, 10 L lux located vertically and placed 1 meter away for 30-90 min after awakening    Please consider CBT.

## 2024-02-20 LAB — HPV I/H RISK 1 DNA SPEC QL NAA+PROBE: NEGATIVE

## 2024-02-22 LAB
% SATURATION: 9 % (CALC) (ref 16–45)
ABSOLUTE BASOPHILS: 13 CELLS/UL (ref 0–200)
ABSOLUTE EOSINOPHILS: 73 CELLS/UL (ref 15–500)
ABSOLUTE LYMPHOCYTES: 2237 CELLS/UL (ref 850–3900)
ABSOLUTE MONOCYTES: 535 CELLS/UL (ref 200–950)
ABSOLUTE NEUTROPHILS: 3742 CELLS/UL (ref 1500–7800)
ALBUMIN/GLOBULIN RATIO: 1.6 (CALC) (ref 1–2.5)
ALBUMIN: 4.4 G/DL (ref 3.6–5.1)
ALKALINE PHOSPHATASE: 90 U/L (ref 31–125)
ALT: 18 U/L (ref 6–29)
AST: 18 U/L (ref 10–30)
BASOPHILS: 0.2 %
BILIRUBIN, TOTAL: 0.5 MG/DL (ref 0.2–1.2)
BUN: 12 MG/DL (ref 7–25)
CALCIUM: 9.3 MG/DL (ref 8.6–10.2)
CARBON DIOXIDE: 26 MMOL/L (ref 20–32)
CHLORIDE: 103 MMOL/L (ref 98–110)
CHOL/HDLC RATIO: 3.4 (CALC)
CHOLESTEROL, TOTAL: 196 MG/DL
CREATININE: 0.74 MG/DL (ref 0.5–0.99)
EGFR: 104 ML/MIN/1.73M2
EOSINOPHILS: 1.1 %
FERRITIN: 6 NG/ML (ref 16–232)
GLOBULIN: 2.7 G/DL (CALC) (ref 1.9–3.7)
GLUCOSE: 102 MG/DL (ref 65–99)
HDL CHOLESTEROL: 57 MG/DL
HEMATOCRIT: 36 % (ref 35–45)
HEMOGLOBIN A1C: 5.5 % OF TOTAL HGB
HEMOGLOBIN: 10.8 G/DL (ref 11.7–15.5)
IRON BINDING CAPACITY: 468 MCG/DL (CALC) (ref 250–450)
IRON, TOTAL: 41 MCG/DL (ref 40–190)
LDL-CHOLESTEROL: 117 MG/DL (CALC)
LYMPHOCYTES: 33.9 %
MCH: 23.3 PG (ref 27–33)
MCHC: 30 G/DL (ref 32–36)
MCV: 77.8 FL (ref 80–100)
MONOCYTES: 8.1 %
MPV: 10.4 FL (ref 7.5–12.5)
NEUTROPHILS: 56.7 %
NON-HDL CHOLESTEROL: 139 MG/DL (CALC)
PLATELET COUNT: 270 THOUSAND/UL (ref 140–400)
POTASSIUM: 4.4 MMOL/L (ref 3.5–5.3)
PROTEIN, TOTAL: 7.1 G/DL (ref 6.1–8.1)
RDW: 17.9 % (ref 11–15)
RED BLOOD CELL COUNT: 4.63 MILLION/UL (ref 3.8–5.1)
SODIUM: 137 MMOL/L (ref 135–146)
TRIGLYCERIDES: 113 MG/DL
TSH: 1.16 MIU/L
VITAMIN B12: 519 PG/ML (ref 200–1100)
VITAMIN D, 25-OH, TOTAL: 17 NG/ML (ref 30–100)
WHITE BLOOD CELL COUNT: 6.6 THOUSAND/UL (ref 3.8–10.8)

## 2024-02-23 ENCOUNTER — PATIENT MESSAGE (OUTPATIENT)
Dept: FAMILY MEDICINE CLINIC | Facility: CLINIC | Age: 44
End: 2024-02-23

## 2024-02-23 DIAGNOSIS — E61.1 IRON DEFICIENCY: Primary | ICD-10-CM

## 2024-02-23 DIAGNOSIS — D50.9 IRON DEFICIENCY ANEMIA, UNSPECIFIED IRON DEFICIENCY ANEMIA TYPE: ICD-10-CM

## 2024-02-23 NOTE — TELEPHONE ENCOUNTER
From: Ro Chandler  To: Yenny Monteiro  Sent: 2/23/2024 10:54 AM CST  Subject: Iron & Vitamin D    Can you please prescribe Vitamin D 08628 IU per week for 3 months then will continue 2000 IU.   For Iron deficiency would you be recommending Infusion? Please let me know the process to get it done ASAP.   Thank you!

## 2024-02-24 ENCOUNTER — TELEPHONE (OUTPATIENT)
Dept: FAMILY MEDICINE CLINIC | Facility: CLINIC | Age: 44
End: 2024-02-24

## 2024-02-24 NOTE — TELEPHONE ENCOUNTER
Called and LVM for patient to know [id number] was a choice for her iron and she could come to the office for a savings card.

## 2024-02-25 DIAGNOSIS — E55.9 VITAMIN D DEFICIENCY: ICD-10-CM

## 2024-02-26 LAB
.: NORMAL
.: NORMAL

## 2024-02-26 RX ORDER — ERGOCALCIFEROL 1.25 MG/1
50000 CAPSULE ORAL WEEKLY
Qty: 12 CAPSULE | Refills: 0 | OUTPATIENT
Start: 2024-02-26

## 2024-02-26 NOTE — TELEPHONE ENCOUNTER
Pt called stating she got a message from the  this past Saturday that she was refilling the VIt D.  Does not understand why this was denied.      I do not see  sent anything to the Pharmacy.

## 2024-02-28 ENCOUNTER — HOSPITAL ENCOUNTER (OUTPATIENT)
Dept: MAMMOGRAPHY | Age: 44
Discharge: HOME | End: 2024-02-28
Attending: FAMILY MEDICINE
Payer: COMMERCIAL

## 2024-02-28 DIAGNOSIS — Z12.31 VISIT FOR SCREENING MAMMOGRAM: ICD-10-CM

## 2024-02-28 PROCEDURE — 77067 SCR MAMMO BI INCL CAD: CPT | Performed by: FAMILY MEDICINE

## 2024-02-28 PROCEDURE — 77063 BREAST TOMOSYNTHESIS BI: CPT | Performed by: FAMILY MEDICINE

## 2024-05-23 DIAGNOSIS — E55.9 VITAMIN D DEFICIENCY: ICD-10-CM

## 2024-05-28 RX ORDER — ERGOCALCIFEROL 1.25 MG/1
50000 CAPSULE, LIQUID FILLED ORAL WEEKLY
Qty: 4 CAPSULE | Refills: 2 | OUTPATIENT
Start: 2024-05-28

## 2024-05-28 NOTE — TELEPHONE ENCOUNTER
Please review. Protocol Failed; No Protocol  Component  Ref Range & Units 2/21/24  8:59 AM   VITAMIN D, 25-OH, TOTAL  30 - 100 ng/mL 17 Low        Requested Prescriptions   Pending Prescriptions Disp Refills    ERGOCALCIFEROL 1.25 MG (30146 UT) Oral Cap [Pharmacy Med Name: VITAMIN D2 1.25MG(50,000 UNIT)] 4 capsule 2     Sig: TAKE 1 CAPSULE BY MOUTH ONE TIME PER WEEK       There is no refill protocol information for this order              Recent Outpatient Visits              3 months ago Routine general medical examination at a health care facility    [facility], [address] [place] [address] Yenny Tapia MD    Office Visit    10 months ago Iron deficiency anemia due to chronic blood loss    [address]    Office Visit    1 year ago Routine general medical examination at a health care facility    [address] Yenny Tapia MD    Office Visit    2 years ago Administrative encounter    [facility], Virtual Visit Silvia Herrera NP    Telemedicine    2 years ago Vitamin D deficiency    [address] Yenny Tapia MD    Office Visit

## 2024-05-29 NOTE — TELEPHONE ENCOUNTER
Refill for prescription vitamin D denied.  This was prescribed for 3-month course.  Should now be taking vitamin D3 OTC 2000 units daily for maintenance.

## 2024-10-14 ENCOUNTER — WALK IN (OUTPATIENT)
Dept: URGENT CARE | Age: 44
End: 2024-10-14

## 2024-10-14 VITALS
TEMPERATURE: 97.4 F | OXYGEN SATURATION: 100 % | HEART RATE: 70 BPM | DIASTOLIC BLOOD PRESSURE: 77 MMHG | RESPIRATION RATE: 16 BRPM | SYSTOLIC BLOOD PRESSURE: 124 MMHG

## 2024-10-14 DIAGNOSIS — J02.9 SORE THROAT: ICD-10-CM

## 2024-10-14 DIAGNOSIS — J01.91 ACUTE RECURRENT SINUSITIS, UNSPECIFIED LOCATION: Primary | ICD-10-CM

## 2024-10-14 LAB
FLUAV AG UPPER RESP QL IA.RAPID: NEGATIVE
FLUBV AG UPPER RESP QL IA.RAPID: NEGATIVE
INTERNAL PROCEDURAL CONTROLS ACCEPTABLE: YES
S PYO AG THROAT QL IA.RAPID: NEGATIVE
SARS-COV+SARS-COV-2 AG RESP QL IA.RAPID: NOT DETECTED
TEST LOT EXPIRATION DATE: NORMAL
TEST LOT EXPIRATION DATE: NORMAL
TEST LOT NUMBER: NORMAL
TEST LOT NUMBER: NORMAL

## 2024-10-14 PROCEDURE — 87428 SARSCOV & INF VIR A&B AG IA: CPT | Performed by: FAMILY MEDICINE

## 2024-10-14 PROCEDURE — 99214 OFFICE O/P EST MOD 30 MIN: CPT | Performed by: FAMILY MEDICINE

## 2024-10-14 PROCEDURE — 87880 STREP A ASSAY W/OPTIC: CPT | Performed by: FAMILY MEDICINE

## 2024-10-14 RX ORDER — AMOXICILLIN 875 MG/1
875 TABLET, COATED ORAL 2 TIMES DAILY
Qty: 14 TABLET | Refills: 0 | Status: SHIPPED | OUTPATIENT
Start: 2024-10-14 | End: 2024-10-21

## 2024-10-14 ASSESSMENT — ENCOUNTER SYMPTOMS
SINUS PAIN: 0
SWOLLEN GLANDS: 0
SORE THROAT: 1
DIARRHEA: 0
SHORTNESS OF BREATH: 0
COUGH: 1
HEADACHES: 0
CONSTIPATION: 0
VOMITING: 0
RHINORRHEA: 1
FATIGUE: 0
FEVER: 0
CHILLS: 0
NAUSEA: 0
ABDOMINAL PAIN: 0
WHEEZING: 0
SINUS PRESSURE: 0
VERTIGO: 0

## 2024-10-14 ASSESSMENT — PAIN SCALES - GENERAL: PAINLEVEL: 5

## 2024-10-17 ENCOUNTER — TELEPHONE (OUTPATIENT)
Dept: URGENT CARE | Age: 44
End: 2024-10-17

## 2024-10-17 RX ORDER — BENZONATATE 100 MG/1
200 CAPSULE ORAL 3 TIMES DAILY PRN
Qty: 20 CAPSULE | Refills: 0 | Status: SHIPPED | OUTPATIENT
Start: 2024-10-17

## 2025-03-10 ENCOUNTER — OFFICE VISIT (OUTPATIENT)
Dept: INTERNAL MEDICINE | Age: 45
End: 2025-03-10

## 2025-03-10 ENCOUNTER — TELEPHONE (OUTPATIENT)
Dept: INTERNAL MEDICINE | Age: 45
End: 2025-03-10

## 2025-03-10 VITALS
BODY MASS INDEX: 32.32 KG/M2 | TEMPERATURE: 97.8 F | HEART RATE: 78 BPM | WEIGHT: 171.19 LBS | DIASTOLIC BLOOD PRESSURE: 75 MMHG | HEIGHT: 61 IN | SYSTOLIC BLOOD PRESSURE: 110 MMHG | RESPIRATION RATE: 18 BRPM | OXYGEN SATURATION: 100 %

## 2025-03-10 DIAGNOSIS — K64.4 EXTERNAL HEMORRHOID: Primary | ICD-10-CM

## 2025-03-10 DIAGNOSIS — L72.3 SEBACEOUS CYST OF RIGHT AXILLA: ICD-10-CM

## 2025-03-10 DIAGNOSIS — K59.09 OTHER CONSTIPATION: ICD-10-CM

## 2025-03-10 PROCEDURE — 99214 OFFICE O/P EST MOD 30 MIN: CPT | Performed by: INTERNAL MEDICINE

## 2025-03-10 PROCEDURE — G2211 COMPLEX E/M VISIT ADD ON: HCPCS | Performed by: INTERNAL MEDICINE

## 2025-03-10 RX ORDER — POLYETHYLENE GLYCOL 3350 17 G/17G
17 POWDER, FOR SOLUTION ORAL DAILY PRN
Qty: 1 EACH | Refills: 0 | Status: SHIPPED | OUTPATIENT
Start: 2025-03-10

## 2025-03-10 RX ORDER — LIDOCAINE HCL AND HYDROCORTISONE ACETATE 20; 20 MG/G; MG/G
CREAM RECTAL
Qty: 1 KIT | Refills: 3 | Status: SHIPPED | OUTPATIENT
Start: 2025-03-10

## 2025-03-10 ASSESSMENT — PATIENT HEALTH QUESTIONNAIRE - PHQ9
1. LITTLE INTEREST OR PLEASURE IN DOING THINGS: NOT AT ALL
SUM OF ALL RESPONSES TO PHQ9 QUESTIONS 1 AND 2: 0
SUM OF ALL RESPONSES TO PHQ9 QUESTIONS 1 AND 2: 0
CLINICAL INTERPRETATION OF PHQ2 SCORE: NO FURTHER SCREENING NEEDED
2. FEELING DOWN, DEPRESSED OR HOPELESS: NOT AT ALL

## 2025-03-20 PROBLEM — L72.3 SEBACEOUS CYST OF RIGHT AXILLA: Status: ACTIVE | Noted: 2025-03-20

## 2025-03-20 PROBLEM — K64.4 EXTERNAL HEMORRHOID: Status: ACTIVE | Noted: 2025-03-20

## 2025-03-20 PROBLEM — K59.09 OTHER CONSTIPATION: Status: ACTIVE | Noted: 2025-03-20

## 2025-04-08 ENCOUNTER — HOSPITAL ENCOUNTER (OUTPATIENT)
Dept: GENERAL RADIOLOGY | Age: 45
Discharge: HOME | End: 2025-04-08
Attending: FAMILY MEDICINE
Payer: COMMERCIAL

## 2025-04-08 ENCOUNTER — OFFICE VISIT (OUTPATIENT)
Dept: FAMILY MEDICINE CLINIC | Facility: CLINIC | Age: 45
End: 2025-04-08
Payer: COMMERCIAL

## 2025-04-08 VITALS
WEIGHT: 172 LBS | TEMPERATURE: 98 F | OXYGEN SATURATION: 98 % | BODY MASS INDEX: 32.47 KG/M2 | DIASTOLIC BLOOD PRESSURE: 78 MMHG | HEART RATE: 79 BPM | RESPIRATION RATE: 18 BRPM | SYSTOLIC BLOOD PRESSURE: 120 MMHG | HEIGHT: 61 IN

## 2025-04-08 DIAGNOSIS — M25.562 CHRONIC PAIN OF BOTH KNEES: ICD-10-CM

## 2025-04-08 DIAGNOSIS — M25.561 CHRONIC PAIN OF BOTH KNEES: ICD-10-CM

## 2025-04-08 DIAGNOSIS — M79.10 MYALGIA: ICD-10-CM

## 2025-04-08 DIAGNOSIS — G89.29 CHRONIC PAIN OF BOTH KNEES: ICD-10-CM

## 2025-04-08 DIAGNOSIS — L72.3 SEBACEOUS CYST OF RIGHT AXILLA: ICD-10-CM

## 2025-04-08 DIAGNOSIS — E55.9 VITAMIN D DEFICIENCY: ICD-10-CM

## 2025-04-08 DIAGNOSIS — Z00.00 ROUTINE GENERAL MEDICAL EXAMINATION AT A HEALTH CARE FACILITY: Primary | ICD-10-CM

## 2025-04-08 DIAGNOSIS — N95.1 PERIMENOPAUSAL: ICD-10-CM

## 2025-04-08 DIAGNOSIS — E61.1 IRON DEFICIENCY: ICD-10-CM

## 2025-04-08 DIAGNOSIS — Z12.31 VISIT FOR SCREENING MAMMOGRAM: ICD-10-CM

## 2025-04-08 DIAGNOSIS — E53.8 VITAMIN B12 DEFICIENCY: ICD-10-CM

## 2025-04-08 DIAGNOSIS — Z23 NEED FOR VACCINATION: ICD-10-CM

## 2025-04-08 DIAGNOSIS — R73.03 PREDIABETES: ICD-10-CM

## 2025-04-08 PROBLEM — K64.4 EXTERNAL HEMORRHOID: Status: ACTIVE | Noted: 2025-03-20

## 2025-04-08 PROBLEM — K59.09 OTHER CONSTIPATION: Status: ACTIVE | Noted: 2025-03-20

## 2025-04-08 PROCEDURE — 90715 TDAP VACCINE 7 YRS/> IM: CPT | Performed by: FAMILY MEDICINE

## 2025-04-08 PROCEDURE — 73562 X-RAY EXAM OF KNEE 3: CPT | Performed by: FAMILY MEDICINE

## 2025-04-08 PROCEDURE — 90471 IMMUNIZATION ADMIN: CPT | Performed by: FAMILY MEDICINE

## 2025-04-08 PROCEDURE — 99396 PREV VISIT EST AGE 40-64: CPT | Performed by: FAMILY MEDICINE

## 2025-04-08 PROCEDURE — 99213 OFFICE O/P EST LOW 20 MIN: CPT | Performed by: FAMILY MEDICINE

## 2025-04-08 NOTE — PROGRESS NOTES
Family Medicine Progress Note    Ro Chandler is a 44 year old female.  ASSESSMENT AND PLAN:  Ro was seen today for physical and other.    Diagnoses and all orders for this visit:    Routine general medical examination at a health care facility  -     CBC With Differential With Platelet  -     Comp Metabolic Panel (14)  -     Lipid Panel  -     Assay, Thyroid Stim Hormone    Visit for screening mammogram  -     3D Mammogram Digital Screen, Bilateral (CPT=77067/76876); Future    Vitamin B12 deficiency  -     VITAMIN B12 [927][Q]    Prediabetes  -     HGB A1C [496] [Q]    Iron deficiency  Discussed referral to hematologist for infusion if iron and ferritin levels are low with her history of hemorrhoid and fissure  -     FERRITIN [457] [Q]  -     IRON AND TIBC [7573] [Q]    Vitamin D deficiency  -     Vitamin D, 25-Hydroxy    Myalgia  -     C-REACTIVE PROTEIN [4420] [Q]  -     SED RATE, WESTERGREN [809] [Q]    Need for vaccination  -     TdaP (Boostrix) Vaccine (> 7 Y)    Perimenopausal  Symptoms include irregular periods, hot flashes, and mood changes. Discussed hormone replacement therapy (HRT) risks and benefits, including endometrial hyperplasia and cancer risk with estrogen alone, and the need for progesterone.  - Consider HRT if symptoms severely impact daily life.  - Advise on dietary changes and regular exercise.  - Discuss over-the-counter plant-based estrogen for hot flashes.    Chronic pain of both knees  -     XR KNEE (3 VIEWS), LEFT (CPT=73562); Future  -     XR KNEE (3 VIEWS), RIGHT (CPT=73562); Future    Sebaceous cyst of right axilla  Currently not inflamed, discussed referral to surgeon for cyst removal vs monitoring, patient prefers to monitor at this time as it is not bothering her.    Age appropriate anticipatory guidance reviewed  Health Maintenance   Topic Date Due    Annual Physical  02/19/2025    Mammogram  02/28/2025    COVID-19 Vaccine (4 -  2024-25 season) 05/08/2025 (Originally 9/1/2024)    Influenza Vaccine (Season Ended) 10/01/2025    Pap Smear  02/19/2027    DTaP,Tdap,and Td Vaccines (3 - Td or Tdap) 04/08/2035    Annual Depression Screening  Completed    Pneumococcal Vaccine: Birth to 50yrs  Aged Out    Meningococcal B Vaccine  Aged Out       The patient indicates understanding of these issues and agrees to the plan.  Follow-Up: The patient is asked to Return in about 1 year (around 4/8/2026) for annual.  .       Yenny Monteiro MD        Chief Complaint   Patient presents with    Physical       The following individual(s) verbally consented to be recorded using ambient AI listening technology and understand that they can each withdraw their consent to this listening technology at any point by asking the clinician to turn off or pause the recording:    Patient name: Ro Chandler      HPI:   Ro Chandler is a 44 year old female.  History of Present Illness  The patient is a 46 year old who presents for annual physical.  Pap done last year was normal, periods are irregular and heavy for the first 2 days.  Does do breast self exam, mammogram done last year was normal, no family history of breast ca.    She has been experiencing hemorrhoid /fissures, with a recent episode prompting a visit to the emergency room last month, was prescribed Lidocaine and hydrocortisone cream and it helped, no symptoms currently. States had symptoms 3-4 times over the past few months, constipation is associated with her fissures.    She describes pain in both knees and calf muscles but denies any swelling in the knees.Has been strength training for the past year.     Her diet is high in protein, including eggs, to support her exercise regimen. She mentions a weight fluctuation, having lost ten pounds but regaining it within three months, feels giving up sugars does aid in weight loss.    She reports significant hair loss, noting it was 'almost half' a  month ago. She has a history of low ferritin and low iron levels .Has not been consistent with taking her supplements..    She experiences irregular periods and heavy menstrual bleeding, which she associates with perimenopause. Symptoms include hot flashes, vaginal dryness,weight fluctuation and sleep issues.     Lump in the right axillary area for the past year that at times feels big and slightly tender.    PAST MEDICAL HISTORY:     Past Medical History:    Iron deficiency     PAST SURGICAL HISTORY:     Past Surgical History:   Procedure Laterality Date           ALLERGY:     Allergies as of 2025    (No Known Allergies)     MEDICATIONS:     Current Outpatient Medications   Medication Sig Dispense Refill    ergocalciferol 1.25 MG (99813 UT) Oral Cap Take 1 capsule (50,000 Units total) by mouth once a week. 12 capsule 0     FAMILY HISTORY:     Family History   Problem Relation Age of Onset    Diabetes Father     Diabetes Maternal Grandmother     Cancer Maternal Grandmother         throat cancer       SOCIAL HISTORY:     Social History     Socioeconomic History    Marital status:      Spouse name: maddie    Number of children: 2   Occupational History    Occupation: [employer] for [employer]   Tobacco Use    Smoking status: Never    Smokeless tobacco: Never   Vaping Use    Vaping status: Never Used   Substance and Sexual Activity    Alcohol use: No    Drug use: No    Sexual activity: Yes     Partners: Male   Other Topics Concern     Service No    Blood Transfusions No    Caffeine Concern No    Occupational Exposure No    Hobby Hazards No    Sleep Concern Yes     Comment: not sleeping well    Stress Concern No    Weight Concern No    Special Diet No    Back Care No    Exercise No    Bike Helmet No    Seat Belt Yes    Self-Exams Yes   Social History Narrative    Balanced diet, does not exercise     Social Drivers of Health     Food Insecurity: No Food Insecurity (2025)    NCSS - Food  Insecurity     Worried About Running Out of Food in the Last Year: No     Ran Out of Food in the Last Year: No   Transportation Needs: No Transportation Needs (4/8/2025)    NCSS - Transportation     Lack of Transportation: No   Housing Stability: Not At Risk (4/8/2025)    NCSS - Housing/Utilities     Has Housing: Yes     Worried About Losing Housing: No     Unable to Get Utilities: No     REVIEW OF SYSTEMS:   Review of Systems   Constitutional:  Negative for appetite change, fatigue, fever and unexpected weight change.   HENT:  Negative for congestion, ear pain, hearing loss and sore throat.    Eyes:  Negative for discharge, redness and visual disturbance.   Respiratory:  Negative for cough, chest tightness and shortness of breath.    Cardiovascular:  Negative for chest pain and palpitations.   Gastrointestinal:  Negative for abdominal pain, blood in stool, constipation and nausea.   Endocrine: Negative for cold intolerance, heat intolerance and polyuria.   Genitourinary:  Positive for menstrual problem. Negative for difficulty urinating, dysuria, frequency and urgency.   Musculoskeletal:  Positive for arthralgias and myalgias. Negative for gait problem and joint swelling.   Skin:  Negative for rash.   Allergic/Immunologic: Negative for food allergies.   Neurological:  Negative for dizziness, weakness, numbness and headaches.   Hematological:  Negative for adenopathy. Does not bruise/bleed easily.   Psychiatric/Behavioral:  Positive for sleep disturbance. Negative for dysphoric mood. The patient is not nervous/anxious.         PHYSICAL EXAM:   /78   Pulse 79   Temp 98 °F (36.7 °C) (Temporal)   Resp 18   Ht 5' 1\" (1.549 m)   Wt 172 lb (78 kg)   LMP 04/03/2025   SpO2 98%   BMI 32.50 kg/m²     Physical Exam  Constitutional:       General: She is not in acute distress.     Appearance: Normal appearance. She is well-developed. She is obese.   HENT:      Head: Normocephalic and atraumatic.      Right Ear:  Tympanic membrane, ear canal and external ear normal.      Left Ear: Tympanic membrane, ear canal and external ear normal.      Nose: Nose normal.      Mouth/Throat:      Mouth: Mucous membranes are moist.      Pharynx: Oropharynx is clear.   Eyes:      Extraocular Movements: Extraocular movements intact.      Conjunctiva/sclera: Conjunctivae normal.      Pupils: Pupils are equal, round, and reactive to light.   Neck:      Thyroid: No thyromegaly.   Cardiovascular:      Rate and Rhythm: Normal rate and regular rhythm.      Pulses: Normal pulses.      Heart sounds: Normal heart sounds. No murmur heard.  Pulmonary:      Effort: Pulmonary effort is normal. No respiratory distress.      Breath sounds: Normal breath sounds.   Chest:      Chest wall: No tenderness.   Breasts:     Right: Normal. No swelling, inverted nipple, mass, nipple discharge, skin change or tenderness.      Left: Normal. No swelling, inverted nipple, mass, nipple discharge, skin change or tenderness.      Comments: Right axillary sebaceous cyst  Abdominal:      General: Bowel sounds are normal. There is no distension.      Palpations: Abdomen is soft. There is no hepatomegaly, splenomegaly or mass.      Tenderness: There is no abdominal tenderness.      Hernia: No hernia is present.   Musculoskeletal:         General: Normal range of motion.      Cervical back: Normal range of motion and neck supple.   Lymphadenopathy:      Cervical: No cervical adenopathy.      Upper Body:      Right upper body: No supraclavicular, axillary or pectoral adenopathy.      Left upper body: No supraclavicular, axillary or pectoral adenopathy.   Skin:     General: Skin is warm.      Findings: No rash.   Neurological:      General: No focal deficit present.      Mental Status: She is alert and oriented to person, place, and time.      Cranial Nerves: No cranial nerve deficit.      Sensory: No sensory deficit.      Motor: No weakness.      Coordination: Coordination normal.       Gait: Gait normal.      Deep Tendon Reflexes: Reflexes are normal and symmetric. Reflexes normal.   Psychiatric:         Mood and Affect: Mood normal.         Behavior: Behavior normal.         Thought Content: Thought content normal.         Judgment: Judgment normal.          The 21st Century Cures Act makes medical notes like these available to patients in the interest of transparency. Please be advised this is a medical document. Medical documents are intended to carry relevant information, facts as evident, and the clinical opinion of the practitioner. The medical note is intended as peer to peer communication and may appear blunt or direct. It is written in medical language and may contain abbreviations or verbiage that are unfamiliar.     Yenny Monteiro MD

## 2025-04-10 LAB
% SATURATION: 13 % (CALC) (ref 16–45)
ABSOLUTE BASOPHILS: 22 CELLS/UL (ref 0–200)
ABSOLUTE EOSINOPHILS: 131 CELLS/UL (ref 15–500)
ABSOLUTE LYMPHOCYTES: 1723 CELLS/UL (ref 850–3900)
ABSOLUTE MONOCYTES: 569 CELLS/UL (ref 200–950)
ABSOLUTE NEUTROPHILS: 4855 CELLS/UL (ref 1500–7800)
ALBUMIN/GLOBULIN RATIO: 1.5 (CALC) (ref 1–2.5)
ALBUMIN: 4.2 G/DL (ref 3.6–5.1)
ALKALINE PHOSPHATASE: 91 U/L (ref 31–125)
ALT: 16 U/L (ref 6–29)
AST: 17 U/L (ref 10–30)
BASOPHILS: 0.3 %
BILIRUBIN, TOTAL: 0.4 MG/DL (ref 0.2–1.2)
BUN: 12 MG/DL (ref 7–25)
C-REACTIVE PROTEIN: 4.6 MG/L
CALCIUM: 9.2 MG/DL (ref 8.6–10.2)
CARBON DIOXIDE: 25 MMOL/L (ref 20–32)
CHLORIDE: 105 MMOL/L (ref 98–110)
CHOL/HDLC RATIO: 4.2 (CALC)
CHOLESTEROL, TOTAL: 193 MG/DL
CREATININE: 0.68 MG/DL (ref 0.5–0.99)
EGFR: 111 ML/MIN/1.73M2
EOSINOPHILS: 1.8 %
FERRITIN: 7 NG/ML (ref 16–232)
GLOBULIN: 2.8 G/DL (CALC) (ref 1.9–3.7)
GLUCOSE: 114 MG/DL (ref 65–99)
HDL CHOLESTEROL: 46 MG/DL
HEMATOCRIT: 37.3 % (ref 35–45)
HEMOGLOBIN A1C: 5.9 % OF TOTAL HGB
HEMOGLOBIN: 11.7 G/DL (ref 11.7–15.5)
IRON BINDING CAPACITY: 398 MCG/DL (CALC) (ref 250–450)
IRON, TOTAL: 51 MCG/DL (ref 40–190)
LDL-CHOLESTEROL: 117 MG/DL (CALC)
LYMPHOCYTES: 23.6 %
MCH: 27 PG (ref 27–33)
MCHC: 31.4 G/DL (ref 32–36)
MCV: 85.9 FL (ref 80–100)
MONOCYTES: 7.8 %
MPV: 10.5 FL (ref 7.5–12.5)
NEUTROPHILS: 66.5 %
NON-HDL CHOLESTEROL: 147 MG/DL (CALC)
PLATELET COUNT: 239 THOUSAND/UL (ref 140–400)
POTASSIUM: 4.6 MMOL/L (ref 3.5–5.3)
PROTEIN, TOTAL: 7 G/DL (ref 6.1–8.1)
RDW: 13.1 % (ref 11–15)
RED BLOOD CELL COUNT: 4.34 MILLION/UL (ref 3.8–5.1)
SED RATE BY MODIFIED$WESTERGREN: 29 MM/H
SODIUM: 139 MMOL/L (ref 135–146)
TRIGLYCERIDES: 181 MG/DL
TSH: 1.31 MIU/L
VITAMIN B12: 339 PG/ML (ref 200–1100)
VITAMIN D, 25-OH, TOTAL: 36 NG/ML (ref 30–100)
WHITE BLOOD CELL COUNT: 7.3 THOUSAND/UL (ref 3.8–10.8)

## 2025-04-19 ENCOUNTER — HOSPITAL ENCOUNTER (OUTPATIENT)
Dept: MAMMOGRAPHY | Age: 45
Discharge: HOME | End: 2025-04-19
Attending: FAMILY MEDICINE
Payer: COMMERCIAL

## 2025-04-19 DIAGNOSIS — Z12.31 VISIT FOR SCREENING MAMMOGRAM: ICD-10-CM

## 2025-04-19 PROCEDURE — 77067 SCR MAMMO BI INCL CAD: CPT | Performed by: FAMILY MEDICINE

## 2025-04-19 PROCEDURE — 77063 BREAST TOMOSYNTHESIS BI: CPT | Performed by: FAMILY MEDICINE

## (undated) NOTE — LETTER
10/01/21        Ro Chandler  [address] [facility]      Dear [facility],    Our records indicate that you have outstanding lab work and or testing that was ordered for you and has not yet been completed:  Orders Placed This Encounter

## (undated) NOTE — LETTER
08/30/19        Ro Chandler  [address] [place] [address]      Dear Kami Nuno,    Our records indicate that you have outstanding lab work and or testing that was ordered for you and has not yet been completed:  Orders Placed This Encounter

## (undated) NOTE — MR AVS SNAPSHOT
Yonny Hewitt [address] [place] [id number]  [phone]               Thank you for choosing us for your health care visit with Windy Winters MD.  We are glad to serve you and happy to provide you with this Ferritin [E]    Complete by: Apr 04, 2017 (Approximate)    Assoc Dx:  Routine general medical examination at a health care facility [Z00.00]           Vitamin B12 [E]    Complete by:   Apr 04, 2017 (Approximate)    Assoc Dx:  Routine general medical exami Diabetes mellitus, type 2 Adults with no symptoms who are overweight or obese and have 1 or more other risk factors for diabetes At least every 3 years   Gonorrhea Sexually active women at increased risk for infection At routine exams   Hepatitis C Anyone Pneumococcal conjugate vaccine (PCV13) and pneumococcal polysaccharide vaccine (PPSV23) Women at increased risk for infection – talk with your healthcare provider PCV13: 1 dose ages 23 to 72 (protects against 13 types of pneumococcal bacteria)  PPSV23: 1 t substitute for professional medical care. Always follow your healthcare professional's instructions. Weight Management: Getting Started  Healthy bodies come in all shapes and sizes. Not all bodies are made to be thin.  For some people, a healthy weig professional for help. Your local hospital can give you more information about nutrition, exercise, and weight loss. Date Last Reviewed: 1/31/2016  © 9690-2462 The [address] [place] [address], [address] [place], [place] [address]. All rights reserved.  This Make half your plate fruits and vegetables Highly refined, white starches including white bread, rice and pasta   Eat plenty of protein, keep the fat content low Sugars:  sodas and sports drinks, candies and desserts   Eat plenty of low-fat dairy products

## (undated) NOTE — LETTER
11/20/17        Ro Chandler  [address] [place] [address]      Dear Rosa Isela Gilliland,    Our records indicate that you have outstanding lab work and or testing that was ordered for you and has not yet been completed:          Iron And Tibc [E]

## (undated) NOTE — LETTER
08/31/20        Ro Chandler  [address] [place] [address]      Dear Petey Ho,    Our records indicate that you have outstanding lab work and or testing that was ordered for you and has not yet been completed:  Orders Placed This Encounter

## (undated) NOTE — MR AVS SNAPSHOT
Mariusz Hewitt [address] [place] [id number] [phone]               Thank you for choosing us for your health care visit with Stephanie Blanca MD.  We are glad to serve you and happy to provide you with this Current Medications          This list is accurate as of: 4/11/17 10:28 AM.  Always use your most recent med list.                ergocalciferol 28524 units Caps   Take 1 capsule (50,000 Units total) by mouth once a week.    Commonly known as:  EMILY

## (undated) NOTE — LETTER
09/23/21        Ro Chandler  [address] [place] [address]      Dear Destini Schmidt,    Our records indicate that you have outstanding lab work and or testing that was ordered for you and has not yet been completed:  Orders Placed This Encounter